# Patient Record
Sex: MALE | Race: WHITE | Employment: OTHER | URBAN - NONMETROPOLITAN AREA
[De-identification: names, ages, dates, MRNs, and addresses within clinical notes are randomized per-mention and may not be internally consistent; named-entity substitution may affect disease eponyms.]

---
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
## 2020-11-25 ENCOUNTER — NURSE TRIAGE (OUTPATIENT)
Dept: OTHER | Facility: CLINIC | Age: 20
End: 2020-11-25

## 2020-11-25 NOTE — TELEPHONE ENCOUNTER
Reason for Disposition   [1] MODERATE pain (e.g., interferes with normal activities, limping) AND [2] present > 3 days    Answer Assessment - Initial Assessment Questions  1. LOCATION: \"Where is the swelling located? \"  (e.g., left, right, both knees)      Left knee    2. SIZE and DESCRIPTION: \"What does the swelling look like? \"  (e.g., entire knee, localized)      Bottom of the knee about size of half of tennis ball     3. ONSET: \"When did the swelling start? \" \"Does it come and go, or is it there all the time? \"      Month and half ago  Flares up then goes back down    4. PAIN: \"Is there any pain? \" If so, ask: \"How bad is it? \" (Scale 1-10; or mild, moderate, severe)      Mild to moderate    5. SETTING: \"Has there been any recent work, exercise or other activity that involved that part of the body? 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 AGGRAVATING FACTORS: \"What makes the knee swelling worse? \" (e.g., walking, climbing stairs, running)      When working down on knees    7. ASSOCIATED SYMPTOMS: \"Is there any pain or redness? \"      Pain no redness    8. OTHER SYMPTOMS: \"Do you have any other symptoms? \" (e.g., chest pain, difficulty breathing, fever, calf pain)      No    9. PREGNANCY: \"Is there any chance you are pregnant? \" \"When was your last menstrual period? \"      n/a    Protocols used: KNEE SWELLING-ADULT-    Caller provided care advice and instructed to call back with worsening symptoms. Attention Provider: Thank you for allowing me to participate in the care of your patient. The patient was connected to triage in response to information provided to the [facility]. Please do not respond through this encounter as the response is not directed to a shared pool.      Warm transfer to [facility]

## 2020-12-15 ENCOUNTER — OFFICE VISIT (OUTPATIENT)
Dept: PRIMARY CARE CLINIC | Age: 20
End: 2020-12-15
Payer: MEDICAID

## 2020-12-15 VITALS
DIASTOLIC BLOOD PRESSURE: 70 MMHG | RESPIRATION RATE: 18 BRPM | HEART RATE: 96 BPM | TEMPERATURE: 99.3 F | HEIGHT: 75 IN | WEIGHT: 200.4 LBS | OXYGEN SATURATION: 98 % | BODY MASS INDEX: 24.92 KG/M2 | SYSTOLIC BLOOD PRESSURE: 130 MMHG

## 2020-12-15 PROCEDURE — 99214 OFFICE O/P EST MOD 30 MIN: CPT | Performed by: FAMILY MEDICINE

## 2020-12-15 ASSESSMENT — PATIENT HEALTH QUESTIONNAIRE - PHQ9
SUM OF ALL RESPONSES TO PHQ QUESTIONS 1-9: 0
SUM OF ALL RESPONSES TO PHQ QUESTIONS 1-9: 0
2. FEELING DOWN, DEPRESSED OR HOPELESS: 0
SUM OF ALL RESPONSES TO PHQ QUESTIONS 1-9: 0
1. LITTLE INTEREST OR PLEASURE IN DOING THINGS: 0
SUM OF ALL RESPONSES TO PHQ9 QUESTIONS 1 & 2: 0

## 2020-12-16 ASSESSMENT — ENCOUNTER SYMPTOMS
RHINORRHEA: 0
COLOR CHANGE: 0
DIARRHEA: 0
COUGH: 0
ABDOMINAL PAIN: 0
NAUSEA: 0
CONSTIPATION: 0
VOMITING: 0

## 2020-12-16 NOTE — PROGRESS NOTES
SUBJECTIVE:    Patient ID: Phyllis Lima is a 21 y.o. male. HPI:   Patient is here today for complaints of left knee pain and swelling. He states that this started about 2 months ago. 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 He states that when he is down on his knee that is what hurts at the most.  He states that if he is up walking it does not typically bother him much. He states that it has been a little red and has been a little warm. He states that he has not had any other joints that have been warm or swollen. He states that he has not had any work-up done of it to this point. He has not tried doing anything for it. He does have a history of ADHD. He has been on Vyvanse as well as Adderall XR in the past.  He states that the Vyvanse worked the best for him. He states the Adderall did not do well for him. He is also taken Ritalin in the past and he states that this made him very irritable. He states that he would like to go back on the Vyvanse if possible. He is having difficulty focusing and getting tasks accomplished. He states that these medications help him stay on task and focus. History reviewed. No pertinent past medical history. Current Outpatient Medications   Medication Sig Dispense Refill    lisdexamfetamine (VYVANSE) 30 MG capsule Take 1 capsule by mouth every morning for 30 days. 30 capsule 0     No current facility-administered medications for this visit. Allergies   Allergen Reactions    Hydromorphone Hcl        Review of Systems   Constitutional: Negative for activity change, appetite change and fatigue. HENT: Negative for congestion and rhinorrhea. Eyes: Negative for visual disturbance. Respiratory: Negative for cough. Cardiovascular: Negative for chest pain and palpitations. Gastrointestinal: Negative for abdominal pain, constipation, diarrhea, nausea and vomiting. Genitourinary: Negative for decreased urine volume and difficulty urinating. Musculoskeletal: Positive for joint swelling (left knee). Negative for arthralgias. Skin: Negative for color change and rash. Allergic/Immunologic: Negative for immunocompromised state. Neurological: Negative for seizures and headaches. Hematological: Does not bruise/bleed easily. Psychiatric/Behavioral: Positive for decreased concentration. Negative for agitation and sleep disturbance. OBJECTIVE:     Physical Exam  Constitutional:       General: He is not in acute distress. Appearance: He is well-developed. He is not diaphoretic. HENT:      Head: Normocephalic and atraumatic. Neck:      Musculoskeletal: Normal range of motion and neck supple. Thyroid: No thyromegaly. Cardiovascular:      Rate and Rhythm: Normal rate and regular rhythm. Heart sounds: Normal heart sounds. Pulmonary:      Effort: Pulmonary effort is normal. No respiratory distress. Breath sounds: Normal breath sounds. No wheezing. Musculoskeletal:      Left knee: He exhibits swelling and effusion. Lymphadenopathy:      Cervical: No cervical adenopathy. Skin:     General: Skin is warm and dry. Findings: No rash. Neurological:      Mental Status: He is alert and oriented to person, place, and time. Psychiatric:         Behavior: Behavior normal.         Thought Content: Thought content normal.         Judgment: Judgment normal.        /70   Pulse 96   Temp 99.3 °F (37.4 °C)   Resp 18   Ht 6' 3\" (1.905 m)   Wt 200 lb 6.4 oz (90.9 kg)   SpO2 98%   BMI 25.05 kg/m²      ASSESSMENT:    Sunday Marc was seen today for knee pain and adhd. Diagnoses and all orders for this visit:    Left knee pain, unspecified chronicity  -     XR KNEE LEFT (3 VIEWS);  Future    Attention deficit hyperactivity disorder (ADHD), combined type

## 2021-03-15 DIAGNOSIS — F90.2 ATTENTION DEFICIT HYPERACTIVITY DISORDER (ADHD), COMBINED TYPE: ICD-10-CM

## 2021-04-20 DIAGNOSIS — F90.2 ATTENTION DEFICIT HYPERACTIVITY DISORDER (ADHD), COMBINED TYPE: ICD-10-CM

## 2021-05-14 ENCOUNTER — OFFICE VISIT (OUTPATIENT)
Dept: PRIMARY CARE CLINIC | Age: 21
End: 2021-05-14
Payer: MEDICAID

## 2021-05-14 VITALS
TEMPERATURE: 98.7 F | RESPIRATION RATE: 16 BRPM | WEIGHT: 184.8 LBS | BODY MASS INDEX: 22.5 KG/M2 | DIASTOLIC BLOOD PRESSURE: 64 MMHG | SYSTOLIC BLOOD PRESSURE: 116 MMHG | HEIGHT: 76 IN | OXYGEN SATURATION: 98 % | HEART RATE: 108 BPM

## 2021-05-14 DIAGNOSIS — M25.562 ACUTE PAIN OF LEFT KNEE: Primary | ICD-10-CM

## 2021-05-14 DIAGNOSIS — M79.604 ACUTE PAIN OF RIGHT LOWER EXTREMITY: ICD-10-CM

## 2021-05-14 DIAGNOSIS — S01.81XA FACIAL LACERATION, INITIAL ENCOUNTER: ICD-10-CM

## 2021-05-14 DIAGNOSIS — Z23 NEED FOR TD VACCINE: ICD-10-CM

## 2021-05-14 DIAGNOSIS — S81.012A KNEE LACERATION, LEFT, INITIAL ENCOUNTER: ICD-10-CM

## 2021-05-14 PROCEDURE — 12001 RPR S/N/AX/GEN/TRNK 2.5CM/<: CPT | Performed by: NURSE PRACTITIONER

## 2021-05-14 PROCEDURE — 90471 IMMUNIZATION ADMIN: CPT | Performed by: NURSE PRACTITIONER

## 2021-05-14 PROCEDURE — 12011 RPR F/E/E/N/L/M 2.5 CM/<: CPT | Performed by: NURSE PRACTITIONER

## 2021-05-14 PROCEDURE — 90714 TD VACC NO PRESV 7 YRS+ IM: CPT | Performed by: NURSE PRACTITIONER

## 2021-05-14 PROCEDURE — 99214 OFFICE O/P EST MOD 30 MIN: CPT | Performed by: NURSE PRACTITIONER

## 2021-05-14 RX ORDER — CEPHALEXIN 500 MG/1
500 CAPSULE ORAL 3 TIMES DAILY
Qty: 21 CAPSULE | Refills: 0 | Status: SHIPPED | OUTPATIENT
Start: 2021-05-14 | End: 2021-05-21

## 2021-05-14 RX ORDER — FLUTICASONE PROPIONATE 50 MCG
2 SPRAY, SUSPENSION (ML) NASAL DAILY
Qty: 1 BOTTLE | Refills: 0 | Status: SHIPPED | OUTPATIENT
Start: 2021-05-14 | End: 2021-12-21

## 2021-05-14 ASSESSMENT — PATIENT HEALTH QUESTIONNAIRE - PHQ9
1. LITTLE INTEREST OR PLEASURE IN DOING THINGS: 0
SUM OF ALL RESPONSES TO PHQ QUESTIONS 1-9: 0
SUM OF ALL RESPONSES TO PHQ QUESTIONS 1-9: 0
SUM OF ALL RESPONSES TO PHQ9 QUESTIONS 1 & 2: 0

## 2021-05-14 NOTE — PROGRESS NOTES
[facility] PHYSICIAN SERVICES  [facility]  [address] [place] [address]  [address] [place] [address]  Dept: [phone]  Dept Fax: [phone]  Loc: [phone]    Deny Granados is a 24 y.o. male who presents today for his medical conditions/complaints as noted below. Deny Granados is c/o of Knee Injury (Pt presents today for knee injury that happened last night while celebrating his 25st birthday. Pt wife states she got it to stop bleeding and glued it about 3 times but everytime he moved, it would opne. back up and bleed. Pt also has some injuries to his face. )        HPI:     HPI     This 41-year-old male presents today for left knee and right forehead injury. He states that he was celebrating his 21st  birthday last night and while inebriated fell on 2 separate occasions 1 landing on his face on concrete. The second 1 he tripped over the tongue of the trailer and landed on his left knee. He reports open wounds above his right eye and on his left kneecap. Chief Complaint   Patient presents with    Knee Injury     Pt presents today for knee injury that happened last night while celebrating his 25st birthday. Pt wife states she got it to stop bleeding and glued it about 3 times but everytime he moved, it would opne. back up and bleed. Pt also has some injuries to his face. History reviewed. No pertinent past medical history.    Past Surgical History:   Procedure Laterality Date    CHOLECYSTECTOMY  March 2014    MOUTH SURGERY      Broke jaw and had to have surrgery        Vitals 5/14/2021 12/15/2020 11/6/2018 10/8/2018 2/27/2018 9/86/5546   SYSTOLIC [id number] [phone] 186 601   DIASTOLIC 64 70 80 64 64 82   Site Left Upper Arm - - - Right Arm -   Position Sitting - - - Sitting -   Cuff Size Large Adult - - - Medium Adult -   Pulse 108 96 89 56 90 78   Temp 98.7 99.3 98.9 98.1 98.7 98.9   Resp 16 18 20 - - 16   SpO2 98 98 99 98 98 -   Weight 184 lb 12.8 oz 200 lb 6.4 oz 183 lb 184 lb 180 lb 171 lb   Height 6' Neurological: Positive for headaches. Negative for dizziness, light-headedness and numbness. Hematological: Negative. Psychiatric/Behavioral: Negative. Objective:     Physical Exam  Vitals and nursing note reviewed. Constitutional:       General: He is not in acute distress. Appearance: Normal appearance. He is not ill-appearing or toxic-appearing. HENT:      Head: Normocephalic and atraumatic. Nose: Nose normal.      Mouth/Throat:      Mouth: Mucous membranes are moist.      Pharynx: Oropharynx is clear. Eyes:      General: Vision grossly intact. Right eye: Discharge present. Left eye: Discharge present. Extraocular Movements: Extraocular movements intact. Conjunctiva/sclera:      Right eye: Right conjunctiva is injected. No hemorrhage. Left eye: Left conjunctiva is injected. No hemorrhage. Pupils: Pupils are equal, round, and reactive to light. Funduscopic exam:     Right eye: No hemorrhage. Red reflex present. Left eye: No hemorrhage. Red reflex present. Comments: Thick stringy allergic discharge from both eyes. Cardiovascular:      Rate and Rhythm: Normal rate and regular rhythm. Pulses: Normal pulses. Heart sounds: Normal heart sounds. Pulmonary:      Effort: Pulmonary effort is normal. No respiratory distress. Breath sounds: Normal breath sounds. No wheezing, rhonchi or rales. Abdominal:      Palpations: Abdomen is soft. Musculoskeletal:         General: Swelling, tenderness, deformity and signs of injury present. Normal range of motion. Cervical back: Normal range of motion and neck supple. No rigidity or tenderness. Right knee: Normal.      Left knee: Swelling, deformity, erythema, laceration and bony tenderness present. Tenderness present over the patellar tendon. Instability Tests: Anterior drawer test negative. Anterior Lachman test negative.  Medial Yunior test negative and lateral Yunior test negative. Legs:    Lymphadenopathy:      Cervical: No cervical adenopathy. Skin:     General: Skin is warm and dry. Capillary Refill: Capillary refill takes less than 2 seconds. Findings: Bruising, lesion and rash present. Neurological:      Mental Status: He is alert and oriented to person, place, and time. Psychiatric:         Mood and Affect: Mood normal.         Behavior: Behavior normal.         Thought Content: Thought content normal.         Judgment: Judgment normal.       /64 (Site: Left Upper Arm, Position: Sitting, Cuff Size: Large Adult)   Pulse 108   Temp 98.7 °F (37.1 °C) (Temporal)   Resp 16   Ht 6' 4\" (1.93 m)   Wt 184 lb 12.8 oz (83.8 kg)   SpO2 98%   BMI 22.49 kg/m²     Assessment:       Diagnosis Orders   1. Acute pain of left knee  XR KNEE LEFT (3 VIEWS)    XR FACIAL BONES (MIN 3 VIEWS )   2. Acute pain of right lower extremity  XR TIBIA FIBULA RIGHT (2 VIEWS)   3. Facial laceration, initial encounter           Plan:     1 and 2. X-ray of left knee and right tib-fib today in the office. Results reviewed no evidence of fracture, dislocation or foreign body. Procedure note. Left knee draped and prepped in a sterile fashion. Site was cleaned with Hibiclens and Betadine. Wound was injected with 10 mils of lidocaine with epi and allowed to sit for 5 minutes to take effect. The wound was then irrigated with normal saline using a 20 mils syringe with 18-gauge needle. The wound was then closed with six 3-0 nylon sutures. Bacitracin was applied and covered with a large Band-Aid. 3.  X-ray of the facial bones was obtained in the office today. Results were reviewed no evidence of fracture or foreign body was noted. Site was cleaned with chlorhexidine and Betadine. Topical lidocaine was applied to wound and allowed to sit. Then the wound was irrigated with normal saline.   Further anesthetic was utilized with 4 mils of lidocaine with epi was injected into the wound and allowed to sit. The wound was then cleaned again due to copious debris. The wound was well approximated and closed utilizing skinaffix adhesive glue and Steri-Strips. Small amount of bacitracin was applied to the cover the wound. He was instructed to keep both of these areas clean. May use small amounts of Dial soap to clean the areas. Apply bacitracin twice a day. Patient given educational materials -see patient instructions. Discussed use, benefit, and side effects of prescribed medications. All patient questions answered. Pt voiced understanding. Reviewed health maintenance. Instructed to continue currentmedications, diet and exercise. Patient agreed with treatment plan. Follow up as directed. MEDICATIONS:  Orders Placed This Encounter   Medications    fluticasone (FLONASE) 50 MCG/ACT nasal spray     Si sprays by Each Nostril route daily     Dispense:  1 Bottle     Refill:  0    cephALEXin (KEFLEX) 500 MG capsule     Sig: Take 1 capsule by mouth 3 times daily for 7 days     Dispense:  21 capsule     Refill:  0         ORDERS:  Orders Placed This Encounter   Procedures    XR KNEE LEFT (3 VIEWS)    XR TIBIA FIBULA RIGHT (2 VIEWS)    XR FACIAL BONES (MIN 3 VIEWS )       Follow-up:  No follow-ups on file. PATIENT INSTRUCTIONS:    Hydrate with gatorade or pedilyte    Use saline eye wash as needed. Pataday allergy eye drops as needed. returnin 10 days for suture removal .    Allow steri-strips to come off on their own. There are no Patient Instructions on file for this visit. Electronically signed by SHAMIR Lawrence NP on 2021 at 8:59 AM    EMR Dragon/transcription disclaimer:  Much of thisencounter note is electronic transcription/translation of spoken language to printed texts. The electronic translation of spoken language may be erroneous, or at times, nonsensical words or phrases may be inadvertentlytranscribed.   Although I have reviewed the note for such errors, some may still exist.

## 2021-05-17 RX ORDER — TETANUS AND DIPHTHERIA TOXOIDS ADSORBED 2; 2 [LF]/.5ML; [LF]/.5ML
0.5 INJECTION INTRAMUSCULAR ONCE
Qty: 0.5 ML | Refills: 0 | Status: SHIPPED | OUTPATIENT
Start: 2021-05-17 | End: 2021-05-17 | Stop reason: CLARIF

## 2021-05-17 ASSESSMENT — ENCOUNTER SYMPTOMS
EYE REDNESS: 1
RESPIRATORY NEGATIVE: 1
PHOTOPHOBIA: 0
EYE PAIN: 0
EYE DISCHARGE: 1
ALLERGIC/IMMUNOLOGIC NEGATIVE: 1
GASTROINTESTINAL NEGATIVE: 1

## 2021-05-17 ASSESSMENT — VISUAL ACUITY: OU: 1

## 2021-05-26 ENCOUNTER — OFFICE VISIT (OUTPATIENT)
Dept: PRIMARY CARE CLINIC | Age: 21
End: 2021-05-26
Payer: MEDICAID

## 2021-05-26 VITALS
HEART RATE: 104 BPM | HEIGHT: 75 IN | OXYGEN SATURATION: 100 % | BODY MASS INDEX: 24.2 KG/M2 | RESPIRATION RATE: 16 BRPM | SYSTOLIC BLOOD PRESSURE: 122 MMHG | DIASTOLIC BLOOD PRESSURE: 76 MMHG | TEMPERATURE: 98.2 F | WEIGHT: 194.6 LBS

## 2021-05-26 DIAGNOSIS — S01.81XA FACIAL LACERATION, INITIAL ENCOUNTER: ICD-10-CM

## 2021-05-26 DIAGNOSIS — M70.42 PREPATELLAR BURSITIS OF LEFT KNEE: Primary | ICD-10-CM

## 2021-05-26 DIAGNOSIS — F90.2 ATTENTION DEFICIT HYPERACTIVITY DISORDER (ADHD), COMBINED TYPE: ICD-10-CM

## 2021-05-26 DIAGNOSIS — S81.012A KNEE LACERATION, LEFT, INITIAL ENCOUNTER: ICD-10-CM

## 2021-05-26 PROCEDURE — 99213 OFFICE O/P EST LOW 20 MIN: CPT | Performed by: FAMILY MEDICINE

## 2021-05-26 ASSESSMENT — ENCOUNTER SYMPTOMS
COUGH: 0
VOMITING: 0
CONSTIPATION: 0
COLOR CHANGE: 0
ABDOMINAL PAIN: 0
RHINORRHEA: 0
DIARRHEA: 0
NAUSEA: 0

## 2021-05-26 NOTE — PROGRESS NOTES
SUBJECTIVE:    Patient ID: Talon Hoover is a 24 y.o. male. HPI:   Patient is seen today for 2-week follow-up after he recently had stitches put in his knee left knee after a fall in his right eyebrow. He states that the areas have healed well. The swelling is gone down in his knees tremendously. He did get a TD vaccine when he was here in the office. He states that he is on Vyvanse and is tolerating this well. He states that the dose is working well for him and he does need a refill on this today. He denies any drainage from the wound area. He states that he has not had any fevers or chills. He is able to move his knee normally. History reviewed. No pertinent past medical history. Current Outpatient Medications on File Prior to Visit   Medication Sig Dispense Refill    fluticasone (FLONASE) 50 MCG/ACT nasal spray 2 sprays by Each Nostril route daily 1 Bottle 0     No current facility-administered medications on file prior to visit. Allergies   Allergen Reactions    Hydromorphone Hcl        Review of Systems   Constitutional: Negative for activity change, appetite change and fatigue. HENT: Negative for congestion and rhinorrhea. Eyes: Negative for visual disturbance. Respiratory: Negative for cough. Cardiovascular: Negative for chest pain and palpitations. Gastrointestinal: Negative for abdominal pain, constipation, diarrhea, nausea and vomiting. Genitourinary: Negative for decreased urine volume and difficulty urinating. Musculoskeletal: Negative for arthralgias. Knee laceration healing well    Skin: Negative for color change and rash. Allergic/Immunologic: Negative for immunocompromised state. Neurological: Negative for seizures and headaches. Hematological: Does not bruise/bleed easily. Psychiatric/Behavioral: Negative for agitation and sleep disturbance. OBJECTIVE:    Physical Exam  Constitutional:       General: He is not in acute distress. Appearance: He is well-developed. He is not diaphoretic. HENT:      Head: Normocephalic and atraumatic. Neck:      Thyroid: No thyromegaly. Cardiovascular:      Rate and Rhythm: Normal rate and regular rhythm. Heart sounds: Normal heart sounds. Pulmonary:      Effort: Pulmonary effort is normal. No respiratory distress. Breath sounds: Normal breath sounds. No wheezing. Abdominal:      General: Bowel sounds are normal.      Palpations: Abdomen is soft. Tenderness: There is no abdominal tenderness. Musculoskeletal:      Cervical back: Normal range of motion and neck supple. Lymphadenopathy:      Cervical: No cervical adenopathy. Skin:     General: Skin is warm and dry. Findings: No rash. Comments: Sutures were removed from the knee laceration. Of the area is clean dry and intact and shows no purulent discharge. There is no streaking erythema. The laceration over his right eyebrow has also healed well and is intact. Neurological:      Mental Status: He is alert and oriented to person, place, and time. Psychiatric:         Behavior: Behavior normal.         Thought Content: Thought content normal.         Judgment: Judgment normal.        /76 (Site: Left Upper Arm, Position: Sitting, Cuff Size: Large Adult)   Pulse 104   Temp 98.2 °F (36.8 °C) (Temporal)   Resp 16   Ht 6' 3\" (1.905 m)   Wt 194 lb 9.6 oz (88.3 kg)   SpO2 100%   BMI 24.32 kg/m²      ASSESSMENT:    Bernadine Lee was seen today for 2 week follow-up. Diagnoses and all orders for this visit:    Prepatellar bursitis of left knee    Attention deficit hyperactivity disorder (ADHD), combined type  -     lisdexamfetamine (VYVANSE) 30 MG capsule; Take 1 capsule by mouth every morning for 30 days. Knee laceration, left, initial encounter    Facial laceration, initial encounter        PLAN:    Vyvanse was refilled today. Continue current medications. Sutures were removed without complication.   Follow-up with us in 6 months for checkup unless needed sooner. EMR Dragon/transcription disclaimer:  Much of this encounter note is electronic transcription/translation of spoken language toprinted texts. The electronic translation of spoken language may be erroneous, or at times, nonsensical words or phrases may be inadvertently transcribed.   Although I have reviewed the note for such errors, some may stillexist.

## 2021-06-01 DIAGNOSIS — F90.2 ATTENTION DEFICIT HYPERACTIVITY DISORDER (ADHD), COMBINED TYPE: ICD-10-CM

## 2021-06-01 RX ORDER — LISDEXAMFETAMINE DIMESYLATE 30 MG/1
CAPSULE ORAL
Qty: 30 CAPSULE | Refills: 0 | Status: SHIPPED | OUTPATIENT
Start: 2021-06-01 | End: 2021-08-05

## 2021-08-05 DIAGNOSIS — F90.2 ATTENTION DEFICIT HYPERACTIVITY DISORDER (ADHD), COMBINED TYPE: ICD-10-CM

## 2021-08-05 RX ORDER — LISDEXAMFETAMINE DIMESYLATE 30 MG/1
CAPSULE ORAL
Qty: 30 CAPSULE | Refills: 0 | Status: SHIPPED | OUTPATIENT
Start: 2021-08-05 | End: 2021-09-15

## 2021-09-15 DIAGNOSIS — F90.2 ATTENTION DEFICIT HYPERACTIVITY DISORDER (ADHD), COMBINED TYPE: ICD-10-CM

## 2021-09-15 RX ORDER — LISDEXAMFETAMINE DIMESYLATE 30 MG/1
CAPSULE ORAL
Qty: 30 CAPSULE | Refills: 0 | Status: SHIPPED | OUTPATIENT
Start: 2021-09-15 | End: 2021-10-19 | Stop reason: SDUPTHER

## 2021-10-19 ENCOUNTER — TELEPHONE (OUTPATIENT)
Dept: PRIMARY CARE CLINIC | Age: 21
End: 2021-10-19

## 2021-10-19 DIAGNOSIS — F90.2 ATTENTION DEFICIT HYPERACTIVITY DISORDER (ADHD), COMBINED TYPE: ICD-10-CM

## 2021-11-18 DIAGNOSIS — F90.2 ATTENTION DEFICIT HYPERACTIVITY DISORDER (ADHD), COMBINED TYPE: ICD-10-CM

## 2021-11-18 RX ORDER — LISDEXAMFETAMINE DIMESYLATE 30 MG/1
CAPSULE ORAL
Qty: 30 CAPSULE | Refills: 0 | Status: SHIPPED | OUTPATIENT
Start: 2021-11-18 | End: 2021-12-21 | Stop reason: SDUPTHER

## 2021-12-21 ENCOUNTER — OFFICE VISIT (OUTPATIENT)
Dept: PRIMARY CARE CLINIC | Age: 21
End: 2021-12-21
Payer: MEDICAID

## 2021-12-21 VITALS
HEART RATE: 88 BPM | DIASTOLIC BLOOD PRESSURE: 72 MMHG | TEMPERATURE: 97.6 F | WEIGHT: 184.6 LBS | HEIGHT: 75 IN | OXYGEN SATURATION: 98 % | SYSTOLIC BLOOD PRESSURE: 122 MMHG | RESPIRATION RATE: 18 BRPM | BODY MASS INDEX: 22.95 KG/M2

## 2021-12-21 DIAGNOSIS — Z79.899 MEDICATION MANAGEMENT: Primary | ICD-10-CM

## 2021-12-21 DIAGNOSIS — F90.2 ATTENTION DEFICIT HYPERACTIVITY DISORDER (ADHD), COMBINED TYPE: ICD-10-CM

## 2021-12-21 PROCEDURE — 80305 DRUG TEST PRSMV DIR OPT OBS: CPT | Performed by: NURSE PRACTITIONER

## 2021-12-21 PROCEDURE — 99213 OFFICE O/P EST LOW 20 MIN: CPT | Performed by: NURSE PRACTITIONER

## 2021-12-21 ASSESSMENT — ENCOUNTER SYMPTOMS
ALLERGIC/IMMUNOLOGIC NEGATIVE: 1
GASTROINTESTINAL NEGATIVE: 1
EYES NEGATIVE: 1
RESPIRATORY NEGATIVE: 1

## 2021-12-21 NOTE — PROGRESS NOTES
[facility] PHYSICIAN SERVICES  [facility]  [address] [place] [address]  [address] [place] [address]  Dept: [phone]  Dept Fax: [phone]  Loc: [phone]    Angel Ba is a 24 y.o. male who presents today for his medical conditions/complaints as noted below. Angel Ba is c/o of Medication Management        HPI:     HPI     This 24-year-old male presents today for medication management for refills on his Vyvanse. He states that he has been maintaining his weight now fairly well. He does increase a bit and increased appetite. He also states that he is sleeping well. He does state on occasion he does take a 1 pull from a THC vape for sleep. Working for [employer] and [employer]. Chief Complaint   Patient presents with    Medication Management     No past medical history on file. Past Surgical History:   Procedure Laterality Date    CHOLECYSTECTOMY  March 2014    MOUTH SURGERY      Broke jaw and had to have surrgery        Vitals 12/21/2021 5/26/2021 5/14/2021 12/15/2020 11/6/2018 38/7/5401   SYSTOLIC 364 100 087 [phone]   DIASTOLIC 72 76 64 70 80 64   Site - Left Upper Arm Left Upper Arm - - -   Position - Sitting Sitting - - -   Cuff Size - Large Adult Large Adult - - -   Pulse 88 104 108 96 89 56   Temp 97.6 98.2 98.7 99.3 98.9 98.1   Resp 18 16 16 18 20 -   SpO2 98 100 98 98 99 98   Weight 184 lb 9.6 oz 194 lb 9.6 oz 184 lb 12.8 oz 200 lb 6.4 oz 183 lb 184 lb   Height 6' 3\" 6' 3\" 6' 4\" 6' 3\" 6' 3\" 6' 3\"   Body mass index 23.07 kg/m2 24.32 kg/m2 22.49 kg/m2 25.05 kg/m2 22.87 kg/m2 23 kg/m2   Some recent data might be hidden       Family History   Problem Relation Age of Onset    Other Mother         MS    Hypertension Father     Cancer Paternal Grandmother         Colon        Social History     Tobacco Use    Smoking status: Never Smoker    Smokeless tobacco: Never Used   Substance Use Topics    Alcohol use: Yes      No current outpatient medications on file prior to visit.      No current facility-administered medications on file prior to visit. Allergies   Allergen Reactions    Hydromorphone Hcl        Health Maintenance   Topic Date Due    Hepatitis C screen  Never done    COVID-19 Vaccine (1) Never done    HPV vaccine (1 - Male 2-dose series) Never done    Varicella vaccine (2 of 2 - 13+ 2-dose series) 08/30/2013    HIV screen  Never done    Flu vaccine (1) Never done    DTaP/Tdap/Td vaccine (3 - Td or Tdap) 05/14/2031    Hepatitis A vaccine  Aged Out    Hepatitis B vaccine  Aged Out    Hib vaccine  Aged Out    Meningococcal (ACWY) vaccine  Aged Out    Pneumococcal 0-64 years Vaccine  Aged Out       Subjective:      Review of Systems   Constitutional: Negative. Negative for fever and unexpected weight change. Eyes: Negative. Respiratory: Negative. Cardiovascular: Negative. Gastrointestinal: Negative. Endocrine: Negative. Genitourinary: Negative. Musculoskeletal: Negative. Skin: Negative. Allergic/Immunologic: Negative. Neurological: Negative. Hematological: Negative. Psychiatric/Behavioral: Positive for decreased concentration and sleep disturbance. Objective:     Physical Exam  Vitals and nursing note reviewed. Constitutional:       General: He is not in acute distress. Appearance: Normal appearance. He is not ill-appearing or toxic-appearing. HENT:      Head: Normocephalic and atraumatic. Nose: Nose normal.      Mouth/Throat:      Mouth: Mucous membranes are moist.   Eyes:      Pupils: Pupils are equal, round, and reactive to light. Cardiovascular:      Rate and Rhythm: Normal rate and regular rhythm. Pulses: Normal pulses. Heart sounds: Normal heart sounds. Pulmonary:      Effort: Pulmonary effort is normal. No respiratory distress. Breath sounds: Normal breath sounds. No wheezing or rhonchi. Abdominal:      General: Bowel sounds are normal.      Palpations: Abdomen is soft.    Musculoskeletal: General: Normal range of motion. Cervical back: Normal range of motion. Skin:     General: Skin is warm and dry. Coloration: Skin is not jaundiced or pale. Findings: No erythema or rash. Neurological:      Mental Status: He is alert and oriented to person, place, and time. Mental status is at baseline. Psychiatric:         Attention and Perception: Attention normal.         Mood and Affect: Mood and affect normal.         Speech: Speech normal.         Behavior: Behavior normal. Behavior is cooperative. Thought Content: Thought content normal. Thought content does not include homicidal or suicidal ideation. Cognition and Memory: Cognition normal.         Judgment: Judgment normal.       /72   Pulse 88   Temp 97.6 °F (36.4 °C)   Resp 18   Ht 6' 3\" (1.905 m)   Wt 184 lb 9.6 oz (83.7 kg)   SpO2 98%   BMI 23.07 kg/m²     Assessment:       Diagnosis Orders   1. Medication management  POCT Rapid Drug Screen   2. Attention deficit hyperactivity disorder (ADHD), combined type           Plan:   POCT rapid drug screen in office today positive for amphetamines. Pansy Ana Cristina reviewed is clean. Contract updated today. PDMP Monitoring:    Last PDMP Junior Crews as Reviewed [facility]):  Review User Review Instant Review Result   Bryce Rico 12/21/2021  4:37 PM Reviewed PDMP [1]     Last Controlled Substance Monitoring Documentation      Office Visit from 10/8/2018 in [facility] The Prescription Monitoring Report for this patient was reviewed today. filed at 10/08/2018 1521   Periodic Controlled Substance Monitoring Possible medication side effects, risk of tolerance/dependence & alternative treatments discussed., No signs of potential drug abuse or diversion identified.  filed at 10/08/2018 1521        Urine Drug Screenings (1 yr)     POCT Rapid Drug Screen  Collected: 12/21/2021 (Final result)    Complete Results              Medication Contract and Consent for Opioid Use Documents Filed     Patient Documents     Type of Document Status Date Received Received By Description    Medication Contract Signed 10/8/2018  3:27 PM Mal Gonzalez request sent to Dr. Henry Mckeon for completion. Patient given educational materials -see patient instructions. Discussed use, benefit, and side effects of prescribed medications. All patient questions answered. Pt voiced understanding. Reviewed health maintenance. Instructed to continue currentmedications, diet and exercise. Patient agreed with treatment plan. Follow up as directed. MEDICATIONS:  No orders of the defined types were placed in this encounter. ORDERS:  Orders Placed This Encounter   Procedures    POCT Rapid Drug Screen       Follow-up:  Return in about 6 months (around 6/21/2022). PATIENT INSTRUCTIONS:  There are no Patient Instructions on file for this visit. Electronically signed by SHAMIR Hunter NP on 12/21/2021 at 5:25 PM    EMR Dragon/transcription disclaimer:  Much of thisencounter note is electronic transcription/translation of spoken language to printed texts. The electronic translation of spoken language may be erroneous, or at times, nonsensical words or phrases may be inadvertentlytranscribed.   Although I have reviewed the note for such errors, some may still exist.

## 2022-01-21 DIAGNOSIS — F90.2 ATTENTION DEFICIT HYPERACTIVITY DISORDER (ADHD), COMBINED TYPE: ICD-10-CM

## 2022-01-21 NOTE — TELEPHONE ENCOUNTER
PDMP Monitoring:    Last PDMP Valentin Randall as Reviewed [facility]):  Review User Review Instant Review Result   Paolo Perez 12/21/2021  4:37 PM Reviewed PDMP [1]     Urine Drug Screenings (1 yr)     POCT Rapid Drug Screen  Collected: 12/21/2021 (Final result)    Complete Results              Medication Contract and Consent for Opioid Use Documents Filed     Patient Documents     Type of Document Status Date Received Received By Description    Medication Contract Signed 10/8/2018  3:27 PM Joann Davenport

## 2022-02-21 DIAGNOSIS — F90.2 ATTENTION DEFICIT HYPERACTIVITY DISORDER (ADHD), COMBINED TYPE: ICD-10-CM

## 2022-02-21 NOTE — TELEPHONE ENCOUNTER
PDMP Monitoring:    Last PDMP Media Lalit as Reviewed [facility]):  Review User Review Instant Review Result   Ángel Marker 12/21/2021  4:37 PM Reviewed PDMP [1]     Urine Drug Screenings (1 yr)     POCT Rapid Drug Screen  Collected: 12/21/2021 (Final result)    Complete Results              Medication Contract and Consent for Opioid Use Documents Filed     Patient Documents     Type of Document Status Date Received Received By Description    Medication Contract Signed 10/8/2018  3:27 PM Yoli Goff

## 2022-03-28 DIAGNOSIS — F90.2 ATTENTION DEFICIT HYPERACTIVITY DISORDER (ADHD), COMBINED TYPE: ICD-10-CM

## 2022-03-28 NOTE — TELEPHONE ENCOUNTER
Provider needs to review PDMP    PDMP Monitoring:    Last PDMP Tom Rooney as Reviewed [facility]):  Review User Review Instant Review Result   Joshua Curiel 12/21/2021  4:37 PM Reviewed PDMP [1]     Urine Drug Screenings (1 yr)     POCT Rapid Drug Screen  Collected: 12/21/2021 (Final result)    Complete Results              Medication Contract and Consent for Opioid Use Documents Filed     Patient Documents     Type of Document Status Date Received Received By Description    Medication Contract Signed 10/8/2018  3:27 PM Henny Martinez

## 2022-04-26 DIAGNOSIS — F90.2 ATTENTION DEFICIT HYPERACTIVITY DISORDER (ADHD), COMBINED TYPE: ICD-10-CM

## 2022-04-26 RX ORDER — LISDEXAMFETAMINE DIMESYLATE 30 MG/1
CAPSULE ORAL
Qty: 30 CAPSULE | Refills: 0 | Status: SHIPPED | OUTPATIENT
Start: 2022-04-26 | End: 2022-05-26

## 2022-04-26 NOTE — TELEPHONE ENCOUNTER
PDMP Monitoring:    Last PDMP Paulo Ear as Reviewed [facility]):  Review User Review Instant Review Result   CONRADO Nick 3/28/2022  1:23 PM Reviewed PDMP [1]     Urine Drug Screenings (1 yr)     POCT Rapid Drug Screen  Collected: 12/21/2021 (Final result)    Complete Results              Medication Contract and Consent for Opioid Use Documents Filed     Patient Documents     Type of Document Status Date Received Received By Description    Medication Contract Signed 10/8/2018  3:27 PM Isidra Hanson

## 2022-05-26 DIAGNOSIS — F90.2 ATTENTION DEFICIT HYPERACTIVITY DISORDER (ADHD), COMBINED TYPE: ICD-10-CM

## 2022-05-26 RX ORDER — LISDEXAMFETAMINE DIMESYLATE 30 MG/1
CAPSULE ORAL
Qty: 30 CAPSULE | Refills: 0 | Status: SHIPPED | OUTPATIENT
Start: 2022-05-26 | End: 2022-06-22

## 2022-05-26 NOTE — TELEPHONE ENCOUNTER
PDMP Monitoring:    Last PDMP Tory Morocho as Reviewed [facility]):  Review User Review Instant Review Result   CONRADO Del Angel Si 3/28/2022  1:23 PM Reviewed PDMP [1]     Urine Drug Screenings (1 yr)     POCT Rapid Drug Screen  Collected: 12/21/2021 (Final result)    Complete Results              Medication Contract and Consent for Opioid Use Documents Filed     Patient Documents     Type of Document Status Date Received Received By Description    Medication Contract Signed 10/8/2018  3:27 PM Caitlyn Nevarez

## 2022-05-26 NOTE — TELEPHONE ENCOUNTER
PDMP Monitoring:    Last PDMP Farzana So as Reviewed [facility]):  Review User Review Instant Review Result   CONRADO Tesfaye 3/28/2022  1:23 PM Reviewed PDMP [1]     Urine Drug Screenings (1 yr)     POCT Rapid Drug Screen  Collected: 12/21/2021 (Final result)    Complete Results              Medication Contract and Consent for Opioid Use Documents Filed     Patient Documents     Type of Document Status Date Received Received By Description    Medication Contract Signed 10/8/2018  3:27 PM Joon Manriquez

## 2022-06-22 DIAGNOSIS — F90.2 ATTENTION DEFICIT HYPERACTIVITY DISORDER (ADHD), COMBINED TYPE: ICD-10-CM

## 2022-06-22 NOTE — TELEPHONE ENCOUNTER
PDMP Monitoring:    Last PDMP [facility] as Reviewed [facility]):  Review User Review Instant Review Result   CONRADO Peterson 3/28/2022  1:23 PM Reviewed PDMP [1]     Urine Drug Screenings (1 yr)     POCT Rapid Drug Screen  Collected: 12/21/2021 (Final result)    Complete Results              Medication Contract and Consent for Opioid Use Documents Filed     Patient Documents     Type of Document Status Date Received Received By Description    Medication Contract Signed 10/8/2018  3:27 PM Eugene Sharif

## 2022-06-28 ENCOUNTER — OFFICE VISIT (OUTPATIENT)
Dept: PRIMARY CARE CLINIC | Age: 22
End: 2022-06-28
Payer: MEDICAID

## 2022-06-28 VITALS
BODY MASS INDEX: 23.5 KG/M2 | OXYGEN SATURATION: 98 % | DIASTOLIC BLOOD PRESSURE: 80 MMHG | SYSTOLIC BLOOD PRESSURE: 130 MMHG | TEMPERATURE: 97.7 F | HEART RATE: 88 BPM | WEIGHT: 189 LBS | HEIGHT: 75 IN

## 2022-06-28 DIAGNOSIS — F90.2 ATTENTION DEFICIT HYPERACTIVITY DISORDER (ADHD), COMBINED TYPE: ICD-10-CM

## 2022-06-28 DIAGNOSIS — F41.9 ANXIETY: Primary | ICD-10-CM

## 2022-06-28 PROCEDURE — 99214 OFFICE O/P EST MOD 30 MIN: CPT | Performed by: FAMILY MEDICINE

## 2022-06-28 RX ORDER — CITALOPRAM 10 MG/1
10 TABLET ORAL DAILY
Qty: 30 TABLET | Refills: 5 | Status: SHIPPED | OUTPATIENT
Start: 2022-06-28

## 2022-06-28 ASSESSMENT — ENCOUNTER SYMPTOMS
RHINORRHEA: 0
VOMITING: 0
CONSTIPATION: 0
NAUSEA: 0
COUGH: 0
DIARRHEA: 0
COLOR CHANGE: 0
ABDOMINAL PAIN: 0

## 2022-06-28 ASSESSMENT — PATIENT HEALTH QUESTIONNAIRE - PHQ9
SUM OF ALL RESPONSES TO PHQ9 QUESTIONS 1 & 2: 0
SUM OF ALL RESPONSES TO PHQ QUESTIONS 1-9: 0
2. FEELING DOWN, DEPRESSED OR HOPELESS: 0
SUM OF ALL RESPONSES TO PHQ QUESTIONS 1-9: 0
1. LITTLE INTEREST OR PLEASURE IN DOING THINGS: 0
SUM OF ALL RESPONSES TO PHQ QUESTIONS 1-9: 0
SUM OF ALL RESPONSES TO PHQ QUESTIONS 1-9: 0

## 2022-06-28 NOTE — PROGRESS NOTES
SUBJECTIVE:    Patient ID: Evangelist Garcia is a 25 y.o. male. HPI:   Patient is seen today for 6-month follow-up on his ADHD. He feels like the dose could go up some. He is tolerating it well. He denies any side effects to the medication. He states that he is sleeping well he states that it is not affected his appetite. He states that he does still struggle some with concentration and feels like going up on the dose may help. He states he is also struggling with anxiety. He states that he does have trouble letting things go and gets worked up over things that may be should be easy to let go. 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 He states that it does not typically affect his sleep. He states that his stomach to stays in knots and he worries a lot. He states that he has never been on anything for anxiety in the past.    Past Medical History:   Diagnosis Date    ADHD (attention deficit hyperactivity disorder)       No current outpatient medications on file prior to visit. No current facility-administered medications on file prior to visit. Allergies   Allergen Reactions    Hydromorphone Hcl        Review of Systems   Constitutional: Negative for activity change, appetite change and fatigue. HENT: Negative for congestion and rhinorrhea. Eyes: Negative for visual disturbance. Respiratory: Negative for cough. Cardiovascular: Negative for chest pain and palpitations. Gastrointestinal: Negative for abdominal pain, constipation, diarrhea, nausea and vomiting. Genitourinary: Negative for decreased urine volume and difficulty urinating. Musculoskeletal: Negative for arthralgias. Skin: Negative for color change and rash. Allergic/Immunologic: Negative for immunocompromised state. Neurological: Negative for seizures and headaches. Hematological: Does not bruise/bleed easily.    Psychiatric/Behavioral: Negative for agitation and sleep disturbance. The patient is nervous/anxious. OBJECTIVE:    Physical Exam  Constitutional:       General: He is not in acute distress. Appearance: Normal appearance. He is well-developed. He is not diaphoretic. HENT:      Head: Normocephalic and atraumatic. Neck:      Thyroid: No thyromegaly. Cardiovascular:      Rate and Rhythm: Normal rate and regular rhythm. Pulses: Normal pulses. Heart sounds: Normal heart sounds. Pulmonary:      Effort: Pulmonary effort is normal. No respiratory distress. Breath sounds: Normal breath sounds. No wheezing. Musculoskeletal:      Cervical back: Normal range of motion and neck supple. Lymphadenopathy:      Cervical: No cervical adenopathy. Skin:     General: Skin is warm and dry. Findings: No rash. Neurological:      General: No focal deficit present. Mental Status: He is alert and oriented to person, place, and time. Mental status is at baseline. Psychiatric:         Mood and Affect: Mood normal.         Behavior: Behavior normal.         Thought Content: Thought content normal.         Judgment: Judgment normal.        /80   Pulse 88   Temp 97.7 °F (36.5 °C)   Ht 6' 3\" (1.905 m)   Wt 189 lb (85.7 kg)   SpO2 98%   BMI 23.62 kg/m²      ASSESSMENT/PLAN:    1. Anxiety  2. Attention deficit hyperactivity disorder (ADHD), combined type  -     lisdexamfetamine 40 MG CAPS; Take 40 mg by mouth every morning for 30 days. , Disp-30 capsule, R-0Normal       Increase Vyvanse to 40 mg daily. Follow-up with us in 6 months for checkup unless needed sooner. I am going to go ahead and start him on Celexa for his anxiety. Discussed that I feel he would benefit from something to take daily. If his symptoms or not getting better he is to let us know.     PDMP Monitoring:    Last PDMP [employer]s as Reviewed [facility]):  Review User Review Instant Review Result   CONRADO VILLALPANDO 3/28/2022  1:23 PM Reviewed PDMP [1]     Last Controlled Substance Monitoring Documentation      [facility] Visit from 10/8/2018 [facility] The Prescription Monitoring Report for this patient was reviewed today. filed at 10/08/2018 1521   Periodic Controlled Substance Monitoring Possible medication side effects, risk of tolerance/dependence & alternative treatments discussed., No signs of potential drug abuse or diversion identified. filed at 10/08/2018 1521        Urine Drug Screenings (1 yr)     POCT Rapid Drug Screen  Collected: 12/21/2021 (Final result)    Complete Results              Medication Contract and Consent for Opioid Use Documents Filed     Patient Documents     Type of Document Status Date Received Received By Description    Medication Contract Signed 10/8/2018  3:27 PM Nato Solares                  EMR Dragon/transcription disclaimer:  Much of this encounter note is electronic transcription/translation of spoken language toprinted texts. The electronic translation of spoken language may be erroneous, or at times, nonsensical words or phrases may be inadvertently transcribed.   Although I have reviewed the note for such errors, some may stillexist.

## 2022-07-25 DIAGNOSIS — F90.2 ATTENTION DEFICIT HYPERACTIVITY DISORDER (ADHD), COMBINED TYPE: ICD-10-CM

## 2022-07-25 RX ORDER — LISDEXAMFETAMINE DIMESYLATE 40 MG/1
CAPSULE ORAL
Qty: 30 CAPSULE | Refills: 0 | Status: SHIPPED | OUTPATIENT
Start: 2022-07-25 | End: 2022-08-26

## 2022-07-25 NOTE — TELEPHONE ENCOUNTER
Provider needs to review PDMP    PDMP Monitoring:    Last PDMP Samy Currie as Reviewed [facility]):  Review User Review Instant Review Result   CONRADO Joseph 3/28/2022  1:23 PM Reviewed PDMP [1]     Urine Drug Screenings (1 yr)       POCT Rapid Drug Screen  Collected: 12/21/2021 (Final result)                  Medication Contract and Consent for Opioid Use Documents Filed       Patient Documents       Type of Document Status Date Received Received By Description    Medication Contract Signed 10/8/2018  3:27 PM Adam Mason

## 2022-08-25 DIAGNOSIS — F90.2 ATTENTION DEFICIT HYPERACTIVITY DISORDER (ADHD), COMBINED TYPE: ICD-10-CM

## 2022-08-26 RX ORDER — LISDEXAMFETAMINE DIMESYLATE 40 MG/1
CAPSULE ORAL
Qty: 30 CAPSULE | Refills: 0 | Status: SHIPPED | OUTPATIENT
Start: 2022-08-26 | End: 2022-10-10

## 2022-08-26 NOTE — TELEPHONE ENCOUNTER
Provider needs to review PDMP    PDMP Monitoring:    Last PDMP Nitin Bishop as Reviewed [facility]):  Review User Review Instant Review Result   CONRADO Delong 3/28/2022  1:23 PM Reviewed PDMP [1]     Urine Drug Screenings (1 yr)     POCT Rapid Drug Screen  Collected: 12/21/2021 (Final result)              Medication Contract and Consent for Opioid Use Documents Filed     Patient Documents     Type of Document Status Date Received Received By Description    Medication Contract Signed 10/8/2018  3:27 PM Dandy Reddy

## 2022-10-10 DIAGNOSIS — F90.2 ATTENTION DEFICIT HYPERACTIVITY DISORDER (ADHD), COMBINED TYPE: ICD-10-CM

## 2022-10-10 RX ORDER — LISDEXAMFETAMINE DIMESYLATE 40 MG/1
CAPSULE ORAL
Qty: 30 CAPSULE | Refills: 0 | Status: SHIPPED | OUTPATIENT
Start: 2022-10-10 | End: 2022-11-09

## 2022-10-10 NOTE — TELEPHONE ENCOUNTER
PDMP Monitoring:    Last PDMP Iggy Berkowitz as Reviewed [facility]):  Review User Review Instant Review Result   CONRADO VILLALPANDO 8/26/2022 11:26 AM Reviewed PDMP [1]     Urine Drug Screenings (1 yr)     POCT Rapid Drug Screen  Collected: 12/21/2021 (Final result)              Medication Contract and Consent for Opioid Use Documents Filed     Patient Documents     Type of Document Status Date Received Received By Description    Medication Contract Signed 10/8/2018  3:27 PM Ron Garcia

## 2022-11-08 DIAGNOSIS — F90.2 ATTENTION DEFICIT HYPERACTIVITY DISORDER (ADHD), COMBINED TYPE: ICD-10-CM

## 2022-11-08 RX ORDER — LISDEXAMFETAMINE DIMESYLATE 40 MG/1
CAPSULE ORAL
Qty: 30 CAPSULE | Refills: 0 | Status: SHIPPED | OUTPATIENT
Start: 2022-11-08 | End: 2022-12-08

## 2022-11-08 NOTE — TELEPHONE ENCOUNTER
PDMP Monitoring:    Last PDMP Sunday Harris as Reviewed [facility]):  Review User Review Instant Review Result   CONRADO VILLALPANDO 8/26/2022 11:26 AM Reviewed PDMP [1]     Urine Drug Screenings (1 yr)     POCT Rapid Drug Screen  Collected: 12/21/2021 (Final result)              Medication Contract and Consent for Opioid Use Documents Filed     Patient Documents     Type of Document Status Date Received Received By Description    Medication Contract Signed 10/8/2018  3:27 PM Shira Hi

## 2022-12-05 DIAGNOSIS — F90.2 ATTENTION DEFICIT HYPERACTIVITY DISORDER (ADHD), COMBINED TYPE: ICD-10-CM

## 2022-12-05 RX ORDER — LISDEXAMFETAMINE DIMESYLATE 40 MG/1
CAPSULE ORAL
Qty: 30 CAPSULE | Refills: 0 | Status: SHIPPED | OUTPATIENT
Start: 2022-12-05 | End: 2023-01-04

## 2022-12-05 RX ORDER — CITALOPRAM 10 MG/1
TABLET ORAL
Qty: 30 TABLET | Refills: 5 | Status: SHIPPED | OUTPATIENT
Start: 2022-12-05

## 2022-12-05 NOTE — TELEPHONE ENCOUNTER
Provider needs to review PDMP    PDMP Monitoring:    Last PDMP Roxana Hortonheaven as Reviewed [facility]):  Review User Review Instant Review Result   CONRADO Del Real 8/26/2022 11:26 AM Reviewed PDMP [1]     Urine Drug Screenings (1 yr)     POCT Rapid Drug Screen  Collected: 12/21/2021 (Final result)              Medication Contract and Consent for Opioid Use Documents Filed     Patient Documents     Type of Document Status Date Received Received By Description    Medication Contract Signed 10/8/2018  3:27 PM Leonard Guaman

## 2023-01-05 DIAGNOSIS — F90.2 ATTENTION DEFICIT HYPERACTIVITY DISORDER (ADHD), COMBINED TYPE: ICD-10-CM

## 2023-01-09 RX ORDER — LISDEXAMFETAMINE DIMESYLATE 40 MG/1
CAPSULE ORAL
Qty: 30 CAPSULE | Refills: 0 | OUTPATIENT
Start: 2023-01-09 | End: 2023-02-08

## 2023-01-13 ENCOUNTER — OFFICE VISIT (OUTPATIENT)
Dept: PRIMARY CARE CLINIC | Age: 23
End: 2023-01-13

## 2023-01-13 VITALS
OXYGEN SATURATION: 98 % | WEIGHT: 190 LBS | TEMPERATURE: 97.6 F | BODY MASS INDEX: 23.62 KG/M2 | RESPIRATION RATE: 16 BRPM | DIASTOLIC BLOOD PRESSURE: 78 MMHG | HEART RATE: 76 BPM | SYSTOLIC BLOOD PRESSURE: 122 MMHG | HEIGHT: 75 IN

## 2023-01-13 DIAGNOSIS — Z79.899 MEDICATION MANAGEMENT: Primary | ICD-10-CM

## 2023-01-13 DIAGNOSIS — F90.2 ATTENTION DEFICIT HYPERACTIVITY DISORDER (ADHD), COMBINED TYPE: ICD-10-CM

## 2023-01-13 LAB
ALCOHOL URINE: NORMAL
AMPHETAMINE SCREEN, URINE: POSITIVE
BARBITURATE SCREEN, URINE: NORMAL
BENZODIAZEPINE SCREEN, URINE: NORMAL
BUPRENORPHINE URINE: NORMAL
COCAINE METABOLITE SCREEN URINE: NORMAL
FENTANYL SCREEN, URINE: NORMAL
GABAPENTIN SCREEN, URINE: NORMAL
MDMA URINE: NORMAL
METHADONE SCREEN, URINE: NORMAL
METHAMPHETAMINE, URINE: NORMAL
OPIATE SCREEN URINE: NORMAL
OXYCODONE SCREEN URINE: NORMAL
PHENCYCLIDINE SCREEN URINE: NORMAL
PROPOXYPHENE SCREEN, URINE: NORMAL
SYNTHETIC CANNABINOIDS(K2) SCREEN, URINE: NORMAL
THC SCREEN, URINE: NORMAL
TRAMADOL SCREEN URINE: NORMAL
TRICYCLIC ANTIDEPRESSANTS, UR: NORMAL

## 2023-01-13 RX ORDER — LISDEXAMFETAMINE DIMESYLATE 40 MG/1
CAPSULE ORAL
Qty: 30 CAPSULE | Refills: 0 | Status: SHIPPED | OUTPATIENT
Start: 2023-01-13 | End: 2023-02-13

## 2023-01-13 RX ORDER — SUCRALFATE 1 G/1
1 TABLET ORAL 4 TIMES DAILY
Qty: 120 TABLET | Refills: 3 | Status: SHIPPED | OUTPATIENT
Start: 2023-01-13

## 2023-01-13 ASSESSMENT — PATIENT HEALTH QUESTIONNAIRE - PHQ9
1. LITTLE INTEREST OR PLEASURE IN DOING THINGS: 0
SUM OF ALL RESPONSES TO PHQ QUESTIONS 1-9: 0
SUM OF ALL RESPONSES TO PHQ QUESTIONS 1-9: 0
SUM OF ALL RESPONSES TO PHQ9 QUESTIONS 1 & 2: 0
2. FEELING DOWN, DEPRESSED OR HOPELESS: 0
SUM OF ALL RESPONSES TO PHQ QUESTIONS 1-9: 0
SUM OF ALL RESPONSES TO PHQ QUESTIONS 1-9: 0

## 2023-01-13 ASSESSMENT — ENCOUNTER SYMPTOMS
ABDOMINAL PAIN: 1
CHEST TIGHTNESS: 0
BLOOD IN STOOL: 0
SHORTNESS OF BREATH: 0
WHEEZING: 0

## 2023-01-13 NOTE — PROGRESS NOTES
Allison Price (:  2000) is a 25 y.o. male,Established patient, here for evaluation of the following chief complaint(s):  Follow-up (6 mth follow up on ADHD meds and refill)         ASSESSMENT/PLAN:  1. Medication management  -     POCT Rapid Drug Screen  2. Attention deficit hyperactivity disorder (ADHD), combined type  -     Lisdexamfetamine Dimesylate (VYVANSE) 40 MG CAPS; TAKE ONE CAPSULE BY MOUTH EVERY MORNING, Disp-30 capsule, R-0Normal    Vyvanse refilled at this visit and CSA signed.  reviewed. Urine drug screen completed. Discussed with patient that he may need to more closely modulate his diet. Patient advised that he may need to avoid heavy fatty foods. We will also do trial with Carafate at this time. Patient advised to take this when he is particularly excited his stomach in these episodes. Return in about 6 months (around 2023). Subjective   SUBJECTIVE/OBJECTIVE:  Allison Price is a 25 y.o. male who presents for controlled substance refill. Patient has been on Vyvanse for several years and notes that it continues to work well for him without any issues. 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 Patient's weight has been stable and he says he is sleeping well. Patient maintains good appetite. Of note patient notes that he occasionally feels sick at his stomach particularly ever since he had his gallbladder taken out. Patient was wondering if there may be any sort of medicine may be able to help with this. Patient says that this is typically very intermittent and does tend to depend upon what he eats. Review of Systems   Constitutional:  Negative for activity change and fever. HENT:  Negative for congestion. Eyes:  Negative for visual disturbance. Respiratory:  Negative for chest tightness, shortness of breath and wheezing. Cardiovascular:  Negative for chest pain.    Gastrointestinal:  Positive for abdominal pain. Negative for blood in stool. Genitourinary:  Negative for difficulty urinating and urgency. Neurological:  Negative for weakness and headaches. Psychiatric/Behavioral:  Negative for confusion. Objective   Physical Exam  Vitals reviewed. Constitutional:       General: He is not in acute distress. Appearance: Normal appearance. He is not ill-appearing. HENT:      Head: Normocephalic and atraumatic. Cardiovascular:      Rate and Rhythm: Normal rate and regular rhythm. Pulses: Normal pulses. Heart sounds: Normal heart sounds. No murmur heard. Pulmonary:      Effort: Pulmonary effort is normal. No respiratory distress. Breath sounds: Normal breath sounds. No wheezing or rhonchi. Chest:      Chest wall: No tenderness. Abdominal:      General: Abdomen is flat. Bowel sounds are normal. There is no distension. Palpations: Abdomen is soft. Tenderness: There is no abdominal tenderness. Musculoskeletal:         General: No swelling. Skin:     General: Skin is warm and dry. Neurological:      General: No focal deficit present. Mental Status: He is alert. Vitals:    01/13/23 0944   BP: 122/78   Pulse: 76   Resp: 16   Temp: 97.6 °F (36.4 °C)   SpO2: 98%        Current Outpatient Medications   Medication Sig Dispense Refill    sucralfate (CARAFATE) 1 GM tablet Take 1 tablet by mouth 4 times daily 120 tablet 3    Lisdexamfetamine Dimesylate (VYVANSE) 40 MG CAPS TAKE ONE CAPSULE BY MOUTH EVERY MORNING 30 capsule 0    citalopram (CELEXA) 10 MG tablet TAKE ONE (1) TABLET BY MOUTH DAILY (Patient not taking: Reported on 1/13/2023) 30 tablet 5     No current facility-administered medications for this visit.       Family History   Problem Relation Age of Onset    Other Mother         MS    Hypertension Father     Cancer Paternal Grandmother         Colon       Past Medical History:   Diagnosis Date    ADHD (attention deficit hyperactivity disorder) Past Surgical History:   Procedure Laterality Date    CHOLECYSTECTOMY  March 2014    MOUTH SURGERY      Broke jaw and had to have surrgery       Allergies   Allergen Reactions    Hydromorphone Hcl         Lab Results   Component Value Date     08/27/2014    K 3.7 08/27/2014     08/27/2014    CO2 28 08/27/2014    BUN 7 08/27/2014    CREATININE 0.6 08/27/2014    GLUCOSE 97 08/27/2014    CALCIUM 9.3 08/27/2014    PROT 7.1 05/01/2014    LABALBU 4.3 05/01/2014    ALKPHOS 177 05/01/2014    AST 13 05/01/2014    ALT 7 05/01/2014    LABGLOM ND 08/27/2014        Lab Results   Component Value Date    WBC 5.74 05/01/2014    HGB 14.0 05/01/2014    HCT 43.0 05/01/2014    MCV 86.5 05/01/2014     05/01/2014                EMR Dragon/transcription disclaimer:  Much of this encounter note is electronic transcription/translation of spoken language toprinted texts. The electronic translation of spoken language may be erroneous, or at times, nonsensical words or phrases may be inadvertently transcribed. Although I have reviewed the note for such errors, some may stillexist.      An electronic signature was used to authenticate this note.     --Cristina Manley MD

## 2023-02-06 DIAGNOSIS — F90.2 ATTENTION DEFICIT HYPERACTIVITY DISORDER (ADHD), COMBINED TYPE: ICD-10-CM

## 2023-02-06 RX ORDER — LISDEXAMFETAMINE DIMESYLATE 40 MG/1
CAPSULE ORAL
Qty: 30 CAPSULE | Refills: 0 | OUTPATIENT
Start: 2023-02-06

## 2023-02-14 DIAGNOSIS — F90.2 ATTENTION DEFICIT HYPERACTIVITY DISORDER (ADHD), COMBINED TYPE: ICD-10-CM

## 2023-02-14 RX ORDER — LISDEXAMFETAMINE DIMESYLATE 40 MG/1
CAPSULE ORAL
Qty: 30 CAPSULE | Refills: 0 | Status: SHIPPED | OUTPATIENT
Start: 2023-02-14 | End: 2023-03-14

## 2023-02-14 NOTE — TELEPHONE ENCOUNTER
PDMP Monitoring:    Last PDMP Lara Webb as Reviewed [facility]):  Review User Review Instant Review Result   CONRADO Collins 12/5/2022  2:34 PM Reviewed PDMP [1]     Urine Drug Screenings (1 yr)     POCT Rapid Drug Screen  Collected: 1/13/2023  9:55 AM (Final result)          POCT Rapid Drug Screen  Collected: 12/21/2021 (Final result)              Medication Contract and Consent for Opioid Use Documents Filed     Patient Documents     Type of Document Status Date Received Received By Description    Medication Contract Signed 10/8/2018  3:27 PM Ysabel Melvin

## 2023-03-15 DIAGNOSIS — F90.2 ATTENTION DEFICIT HYPERACTIVITY DISORDER (ADHD), COMBINED TYPE: ICD-10-CM

## 2023-03-15 RX ORDER — LISDEXAMFETAMINE DIMESYLATE 40 MG/1
CAPSULE ORAL
Qty: 30 CAPSULE | Refills: 0 | Status: SHIPPED | OUTPATIENT
Start: 2023-03-15 | End: 2023-04-15

## 2023-03-15 NOTE — TELEPHONE ENCOUNTER
Provider needs to review PDMP    PDMP Monitoring:    Last PDMP Vivek Parkinsono as Reviewed [facility]):  Review User Review Instant Review Result   Carl Vu 12/5/2022  2:34 PM Reviewed PDMP [1]     Urine Drug Screenings (1 yr)     POCT Rapid Drug Screen  Collected: 1/13/2023  9:55 AM (Final result)          POCT Rapid Drug Screen  Collected: 12/21/2021 (Final result)              Medication Contract and Consent for Opioid Use Documents Filed     Patient Documents     Type of Document Status Date Received Received By Description    Medication Contract Signed 10/8/2018  3:27 PM Ella Bryan

## 2023-05-10 DIAGNOSIS — F90.2 ATTENTION DEFICIT HYPERACTIVITY DISORDER (ADHD), COMBINED TYPE: ICD-10-CM

## 2023-05-10 NOTE — TELEPHONE ENCOUNTER
Provider needs to review PDMP    PDMP Monitoring:    Last PDMP Henrry Smith as Reviewed [facility]):  Review User Review Instant Review Result   Isidro Guzman 3/15/2023  1:23 PM Reviewed PDMP [1]     Urine Drug Screenings (1 yr)       POCT Rapid Drug Screen  Collected: 1/13/2023  9:55 AM (Final result)              POCT Rapid Drug Screen  Collected: 12/21/2021 (Final result)                  Medication Contract and Consent for Opioid Use Documents Filed       Patient Documents       Type of Document Status Date Received Received By Description    Medication Contract Signed 10/8/2018  3:27 PM Tomer Coe

## 2023-05-11 RX ORDER — LISDEXAMFETAMINE DIMESYLATE 40 MG/1
40 CAPSULE ORAL DAILY
Qty: 30 CAPSULE | Refills: 0 | Status: SHIPPED | OUTPATIENT
Start: 2023-05-11 | End: 2023-06-10

## 2023-06-07 DIAGNOSIS — F90.2 ATTENTION DEFICIT HYPERACTIVITY DISORDER (ADHD), COMBINED TYPE: ICD-10-CM

## 2023-06-07 RX ORDER — LISDEXAMFETAMINE DIMESYLATE 40 MG/1
CAPSULE ORAL
Qty: 30 CAPSULE | Refills: 0 | Status: SHIPPED | OUTPATIENT
Start: 2023-06-07 | End: 2023-07-07

## 2023-06-07 NOTE — TELEPHONE ENCOUNTER
PDMP Monitoring:    Last PDMP Alivia Earing as Reviewed [facility]):  Review User Review Instant Review Result   Maksim Pena 5/11/2023  7:27 AM Reviewed PDMP [1]     Urine Drug Screenings (1 yr)     POCT Rapid Drug Screen  Collected: 1/13/2023  9:55 AM (Final result)          POCT Rapid Drug Screen  Collected: 12/21/2021 (Final result)              Medication Contract and Consent for Opioid Use Documents Filed     Patient Documents     Type of Document Status Date Received Received By Description    Medication Contract Signed 10/8/2018  3:27 PM Yandy High

## 2023-07-07 DIAGNOSIS — F90.2 ATTENTION DEFICIT HYPERACTIVITY DISORDER (ADHD), COMBINED TYPE: ICD-10-CM

## 2023-07-07 RX ORDER — LISDEXAMFETAMINE DIMESYLATE 40 MG/1
40 CAPSULE ORAL DAILY
Qty: 30 CAPSULE | Refills: 0 | Status: SHIPPED | OUTPATIENT
Start: 2023-07-07 | End: 2023-08-08

## 2023-07-17 ENCOUNTER — OFFICE VISIT (OUTPATIENT)
Dept: PRIMARY CARE CLINIC | Age: 23
End: 2023-07-17
Payer: MEDICAID

## 2023-07-17 VITALS
RESPIRATION RATE: 16 BRPM | OXYGEN SATURATION: 97 % | HEIGHT: 76 IN | SYSTOLIC BLOOD PRESSURE: 130 MMHG | DIASTOLIC BLOOD PRESSURE: 78 MMHG | WEIGHT: 191.2 LBS | HEART RATE: 80 BPM | BODY MASS INDEX: 23.28 KG/M2

## 2023-07-17 DIAGNOSIS — F41.9 ANXIETY: Primary | ICD-10-CM

## 2023-07-17 DIAGNOSIS — F90.2 ATTENTION DEFICIT HYPERACTIVITY DISORDER (ADHD), COMBINED TYPE: ICD-10-CM

## 2023-07-17 PROCEDURE — 99212 OFFICE O/P EST SF 10 MIN: CPT | Performed by: FAMILY MEDICINE

## 2023-07-17 ASSESSMENT — ENCOUNTER SYMPTOMS
COLOR CHANGE: 0
CONSTIPATION: 0
COUGH: 0
RHINORRHEA: 0
NAUSEA: 0
VOMITING: 0
ABDOMINAL PAIN: 0
DIARRHEA: 0

## 2023-07-17 NOTE — PROGRESS NOTES
SUBJECTIVE:    Patient ID: Murvin Dancer is a 21 y.o. male. HPI:   Patient is seen today for 6-month follow-up. He is on Vyvanse for ADHD. He is tolerating this well. He denies any changes in the medication recently. He states that He is tolerating it well. He is no longer taking his citalopram and does not feel that he needs it. He feels like his anxiety is currently well controlled. He is also not taking his Carafate. Past Medical History:   Diagnosis Date    ADHD (attention deficit hyperactivity disorder)       Current Outpatient Medications on File Prior to Visit   Medication Sig Dispense Refill    Lisdexamfetamine Dimesylate (VYVANSE) 40 MG CAPS Take 40 mg by mouth daily for 30 days. Max Daily Amount: 40 mg 30 capsule 0     No current facility-administered medications on file prior to visit. Allergies   Allergen Reactions    Hydromorphone Hcl        Review of Systems   Constitutional:  Negative for activity change, appetite change and fatigue. HENT:  Negative for congestion and rhinorrhea. Eyes:  Negative for visual disturbance. Respiratory:  Negative for cough. Cardiovascular:  Negative for chest pain and palpitations. Gastrointestinal:  Negative for abdominal pain, constipation, diarrhea, nausea and vomiting. Genitourinary:  Negative for decreased urine volume and difficulty urinating. Musculoskeletal:  Negative for arthralgias. Skin:  Negative for color change and rash. Allergic/Immunologic: Negative for immunocompromised state. Neurological:  Negative for seizures and headaches. Hematological:  Does not bruise/bleed easily. Psychiatric/Behavioral:  Negative for agitation and sleep disturbance. OBJECTIVE:    Physical Exam  Constitutional:       General: He is not in acute distress. Appearance: Normal appearance. He is well-developed. He is not diaphoretic. HENT:      Head: Normocephalic and atraumatic. Neck:      Thyroid: No thyromegaly.

## 2023-08-08 DIAGNOSIS — F90.2 ATTENTION DEFICIT HYPERACTIVITY DISORDER (ADHD), COMBINED TYPE: ICD-10-CM

## 2023-08-08 RX ORDER — LISDEXAMFETAMINE DIMESYLATE 40 MG/1
CAPSULE ORAL
Qty: 30 CAPSULE | Refills: 0 | Status: SHIPPED | OUTPATIENT
Start: 2023-08-08 | End: 2023-09-08

## 2023-08-08 NOTE — TELEPHONE ENCOUNTER
PDMP Monitoring:    Last PDMP Paige Brand as Reviewed [facility]):  Review User Review Instant Review Result   CONRADO Penny 7/17/2023  4:34 PM Reviewed PDMP [1]     Urine Drug Screenings (1 yr)     POCT Rapid Drug Screen  Collected: 1/13/2023  9:55 AM (Final result)          POCT Rapid Drug Screen  Collected: 12/21/2021 (Final result)              Medication Contract and Consent for Opioid Use Documents Filed     Patient Documents     Type of Document Status Date Received Received By Description    Medication Contract Signed 10/8/2018  3:27 PM Lottie Harrington

## 2023-09-05 DIAGNOSIS — F90.2 ATTENTION DEFICIT HYPERACTIVITY DISORDER (ADHD), COMBINED TYPE: ICD-10-CM

## 2023-09-05 RX ORDER — LISDEXAMFETAMINE DIMESYLATE 40 MG/1
CAPSULE ORAL
Qty: 30 CAPSULE | Refills: 0 | Status: SHIPPED | OUTPATIENT
Start: 2023-09-05 | End: 2023-10-05

## 2023-09-05 NOTE — TELEPHONE ENCOUNTER
Provider needs to review PDMP    PDMP Monitoring:    Last PDMP Paige Brand as Reviewed [facility]):  Review User Review Instant Review Result   Mariama Camacho 7/17/2023  4:34 PM Reviewed PDMP [1]     Urine Drug Screenings (1 yr)       POCT Rapid Drug Screen  Collected: 1/13/2023  9:55 AM (Final result)              POCT Rapid Drug Screen  Collected: 12/21/2021 (Final result)                  Medication Contract and Consent for Opioid Use Documents Filed       Patient Documents       Type of Document Status Date Received Received By Description    Medication Contract Signed 10/8/2018  3:27 PM Lottie Harrington

## 2023-09-12 ENCOUNTER — ANCILLARY PROCEDURE (OUTPATIENT)
Dept: PRIMARY CARE CLINIC | Age: 23
End: 2023-09-12

## 2023-09-12 ENCOUNTER — OFFICE VISIT (OUTPATIENT)
Dept: PRIMARY CARE CLINIC | Age: 23
End: 2023-09-12
Payer: MEDICAID

## 2023-09-12 VITALS
OXYGEN SATURATION: 99 % | TEMPERATURE: 97.6 F | BODY MASS INDEX: 24.37 KG/M2 | RESPIRATION RATE: 16 BRPM | SYSTOLIC BLOOD PRESSURE: 124 MMHG | DIASTOLIC BLOOD PRESSURE: 74 MMHG | HEART RATE: 98 BPM | HEIGHT: 75 IN | WEIGHT: 196 LBS

## 2023-09-12 DIAGNOSIS — G89.29 CHRONIC MIDLINE LOW BACK PAIN WITH BILATERAL SCIATICA: Primary | ICD-10-CM

## 2023-09-12 DIAGNOSIS — M54.41 CHRONIC MIDLINE LOW BACK PAIN WITH BILATERAL SCIATICA: ICD-10-CM

## 2023-09-12 DIAGNOSIS — G89.29 CHRONIC MIDLINE LOW BACK PAIN WITH BILATERAL SCIATICA: ICD-10-CM

## 2023-09-12 DIAGNOSIS — M54.41 CHRONIC MIDLINE LOW BACK PAIN WITH BILATERAL SCIATICA: Primary | ICD-10-CM

## 2023-09-12 DIAGNOSIS — M54.42 CHRONIC MIDLINE LOW BACK PAIN WITH BILATERAL SCIATICA: Primary | ICD-10-CM

## 2023-09-12 DIAGNOSIS — M54.42 CHRONIC MIDLINE LOW BACK PAIN WITH BILATERAL SCIATICA: ICD-10-CM

## 2023-09-12 PROCEDURE — 99214 OFFICE O/P EST MOD 30 MIN: CPT | Performed by: FAMILY MEDICINE

## 2023-09-12 RX ORDER — TIZANIDINE 2 MG/1
2 TABLET ORAL EVERY 6 HOURS PRN
Qty: 30 TABLET | Refills: 0 | Status: SHIPPED | OUTPATIENT
Start: 2023-09-12

## 2023-09-12 RX ORDER — MELOXICAM 7.5 MG/1
7.5 TABLET ORAL DAILY PRN
Qty: 30 TABLET | Refills: 0 | Status: SHIPPED | OUTPATIENT
Start: 2023-09-12

## 2023-09-13 ASSESSMENT — ENCOUNTER SYMPTOMS
BACK PAIN: 1
BLOOD IN STOOL: 0
WHEEZING: 0
CHEST TIGHTNESS: 0
ABDOMINAL PAIN: 0
SHORTNESS OF BREATH: 0

## 2023-09-14 DIAGNOSIS — M54.42 CHRONIC MIDLINE LOW BACK PAIN WITH BILATERAL SCIATICA: Primary | ICD-10-CM

## 2023-09-14 DIAGNOSIS — G89.29 CHRONIC MIDLINE LOW BACK PAIN WITH BILATERAL SCIATICA: Primary | ICD-10-CM

## 2023-09-14 DIAGNOSIS — M54.41 CHRONIC MIDLINE LOW BACK PAIN WITH BILATERAL SCIATICA: Primary | ICD-10-CM

## 2023-09-21 ENCOUNTER — TELEPHONE (OUTPATIENT)
Dept: PRIMARY CARE CLINIC | Age: 23
End: 2023-09-21

## 2023-09-21 NOTE — TELEPHONE ENCOUNTER
----- Message from Devante Fu sent at 9/21/2023  3:58 PM CDT -----  Subject: Message to Provider    QUESTIONS  Information for Provider? PT wife called with concerns of the pain meds   masking the problem of what is wrong with his back during his physical   therapy. Please f/u.  ---------------------------------------------------------------------------  --------------  Francoise Hughes FNXX  [phone]; OK to leave message on voicemail  ---------------------------------------------------------------------------  --------------  SCRIPT ANSWERS  Relationship to Patient? Spouse/Partner  Representative Name? Jae Lazo  Is the representative on the Communication Release of Information (RADHA)   form in Epic?  Yes

## 2023-09-21 NOTE — TELEPHONE ENCOUNTER
Unfortunately I do not really know because they saw Dr. Abby Serrano.    Typically the recommendation is if it is not something that has had happened with an acute injury then do physical therapy is the best option for acute flareups of lower back pain before insurance will cover additional imaging studies such as an MRI, etc.

## 2023-10-03 DIAGNOSIS — F90.2 ATTENTION DEFICIT HYPERACTIVITY DISORDER (ADHD), COMBINED TYPE: ICD-10-CM

## 2023-10-03 RX ORDER — LISDEXAMFETAMINE DIMESYLATE 40 MG/1
CAPSULE ORAL
Qty: 30 CAPSULE | Refills: 0 | Status: SHIPPED | OUTPATIENT
Start: 2023-10-03 | End: 2023-11-02

## 2023-10-03 NOTE — TELEPHONE ENCOUNTER
PDMP Monitoring:    Last PDMP Milton Mishra as Reviewed [facility]):  Review User Review Instant Review Result   CONRADO VILLALPANDO 9/5/2023 11:25 AM Reviewed PDMP [1]     Urine Drug Screenings (1 yr)     POCT Rapid Drug Screen  Collected: 1/13/2023  9:55 AM (Final result)          POCT Rapid Drug Screen  Collected: 12/21/2021 (Final result)              Medication Contract and Consent for Opioid Use Documents Filed     Patient Documents     Type of Document Status Date Received Received By Description    Medication Contract Signed 10/8/2018  3:27 PM Mohan Dominic

## 2023-11-03 DIAGNOSIS — F90.2 ATTENTION DEFICIT HYPERACTIVITY DISORDER (ADHD), COMBINED TYPE: ICD-10-CM

## 2023-11-03 RX ORDER — LISDEXAMFETAMINE DIMESYLATE 40 MG/1
CAPSULE ORAL
Qty: 30 CAPSULE | Refills: 0 | Status: SHIPPED | OUTPATIENT
Start: 2023-11-03 | End: 2023-12-05

## 2023-11-03 NOTE — TELEPHONE ENCOUNTER
PDMP Monitoring:    Last PDMP Roberto Carlos as Reviewed ([place]):  Review User Review Instant Review Result   LLOYD EDWARDS 10/3/2023 12:17 PM Reviewed PDMP [1]     Urine Drug Screenings (1 yr)     POCT Rapid Drug Screen  Collected: 1/13/2023  9:55 AM (Final result)          POCT Rapid Drug Screen  Collected: 12/21/2021 (Final result)              Medication Contract and Consent for Opioid Use Documents Filed     Patient Documents     Type of Document Status Date Received Received By Description    Medication Contract Signed 10/8/2018  3:27 PM BRANDY WELSH

## 2023-12-05 DIAGNOSIS — F90.2 ATTENTION DEFICIT HYPERACTIVITY DISORDER (ADHD), COMBINED TYPE: ICD-10-CM

## 2023-12-05 RX ORDER — LISDEXAMFETAMINE DIMESYLATE 40 MG/1
CAPSULE ORAL
Qty: 30 CAPSULE | Refills: 0 | Status: SHIPPED | OUTPATIENT
Start: 2023-12-05 | End: 2024-01-04

## 2023-12-05 NOTE — TELEPHONE ENCOUNTER
PDMP Monitoring:    Last PDMP Vance Urias as Reviewed [facility]):  Review User Review Instant Review Result   Urbano Ortiz 11/3/2023  5:12 PM Reviewed PDMP [1]     Urine Drug Screenings (1 yr)       POCT Rapid Drug Screen  Collected: 1/13/2023  9:55 AM (Final result)              POCT Rapid Drug Screen  Collected: 12/21/2021 (Final result)                  Medication Contract and Consent for Opioid Use Documents Filed       Patient Documents       Type of Document Status Date Received Received By Description    Medication Contract Signed 10/8/2018  3:27 PM Po Manley

## 2024-01-05 DIAGNOSIS — F90.2 ATTENTION DEFICIT HYPERACTIVITY DISORDER (ADHD), COMBINED TYPE: ICD-10-CM

## 2024-01-05 RX ORDER — LISDEXAMFETAMINE DIMESYLATE 40 MG/1
CAPSULE ORAL
Qty: 30 CAPSULE | Refills: 0 | Status: SHIPPED | OUTPATIENT
Start: 2024-01-05 | End: 2024-02-04

## 2024-01-05 NOTE — TELEPHONE ENCOUNTER
PDMP Monitoring:    Last PDMP Roberto Carlos as Reviewed ([place]):  Review User Review Instant Review Result   LLOYD EDWARDS 11/3/2023  5:12 PM Reviewed PDMP [1]     Urine Drug Screenings (1 yr)       POCT Rapid Drug Screen  Collected: 1/13/2023  9:55 AM (Final result)              POCT Rapid Drug Screen  Collected: 12/21/2021 (Final result)                  Medication Contract and Consent for Opioid Use Documents Filed       Patient Documents       Type of Document Status Date Received Received By Description    Medication Contract Signed 10/8/2018  3:27 PM BRANDY WELSH

## 2024-01-29 ENCOUNTER — OFFICE VISIT (OUTPATIENT)
Dept: PRIMARY CARE CLINIC | Age: 24
End: 2024-01-29
Payer: MEDICAID

## 2024-01-29 VITALS
HEIGHT: 76 IN | HEART RATE: 109 BPM | OXYGEN SATURATION: 100 % | SYSTOLIC BLOOD PRESSURE: 130 MMHG | TEMPERATURE: 97.2 F | RESPIRATION RATE: 18 BRPM | DIASTOLIC BLOOD PRESSURE: 80 MMHG | WEIGHT: 197.2 LBS | BODY MASS INDEX: 24.01 KG/M2

## 2024-01-29 DIAGNOSIS — F90.2 ATTENTION DEFICIT HYPERACTIVITY DISORDER (ADHD), COMBINED TYPE: Primary | ICD-10-CM

## 2024-01-29 DIAGNOSIS — Z79.899 MEDICATION MANAGEMENT: ICD-10-CM

## 2024-01-29 PROCEDURE — 99213 OFFICE O/P EST LOW 20 MIN: CPT | Performed by: FAMILY MEDICINE

## 2024-01-29 RX ORDER — LISDEXAMFETAMINE DIMESYLATE CAPSULES 40 MG/1
1 CAPSULE ORAL DAILY
Qty: 30 CAPSULE | Refills: 0 | Status: CANCELLED | OUTPATIENT
Start: 2024-01-29 | End: 2024-02-28

## 2024-01-29 RX ORDER — LISDEXAMFETAMINE DIMESYLATE CAPSULES 50 MG/1
50 CAPSULE ORAL EVERY MORNING
Qty: 30 CAPSULE | Refills: 0 | Status: SHIPPED | OUTPATIENT
Start: 2024-01-29 | End: 2024-02-28

## 2024-01-29 SDOH — ECONOMIC STABILITY: FOOD INSECURITY: WITHIN THE PAST 12 MONTHS, YOU WORRIED THAT YOUR FOOD WOULD RUN OUT BEFORE YOU GOT MONEY TO BUY MORE.: NEVER TRUE

## 2024-01-29 SDOH — ECONOMIC STABILITY: FOOD INSECURITY: WITHIN THE PAST 12 MONTHS, THE FOOD YOU BOUGHT JUST DIDN'T LAST AND YOU DIDN'T HAVE MONEY TO GET MORE.: NEVER TRUE

## 2024-01-29 SDOH — ECONOMIC STABILITY: HOUSING INSECURITY
IN THE LAST 12 MONTHS, WAS THERE A TIME WHEN YOU DID NOT HAVE A STEADY PLACE TO SLEEP OR SLEPT IN A SHELTER (INCLUDING NOW)?: NO

## 2024-01-29 SDOH — ECONOMIC STABILITY: INCOME INSECURITY: HOW HARD IS IT FOR YOU TO PAY FOR THE VERY BASICS LIKE FOOD, HOUSING, MEDICAL CARE, AND HEATING?: NOT VERY HARD

## 2024-01-29 ASSESSMENT — ENCOUNTER SYMPTOMS
NAUSEA: 0
ABDOMINAL PAIN: 0
COLOR CHANGE: 0
RHINORRHEA: 0
CONSTIPATION: 0
VOMITING: 0
DIARRHEA: 0
COUGH: 0

## 2024-01-29 NOTE — PROGRESS NOTES
10/08/2018 1521          Urine Drug Screenings (1 yr)       POCT Rapid Drug Screen  Collected: 1/29/2024 (Final result)              POCT Rapid Drug Screen  Collected: 1/13/2023  9:55 AM (Final result)              POCT Rapid Drug Screen  Collected: 12/21/2021 (Final result)                  Medication Contract and Consent for Opioid Use Documents Filed       Patient Documents       Type of Document Status Date Received Received By Description    Medication Contract Signed 10/8/2018  3:27 PM BRANDY WELSH/transcription disclaimer:  Much of this encounter note is electronic transcription/translation of spoken language toprinted texts.  The electronic translation of spoken language may be erroneous, or at times, nonsensical words or phrases may be inadvertently transcribed.  Although I have reviewed the note for such errors, some may stillexist.

## 2024-02-27 DIAGNOSIS — F90.2 ATTENTION DEFICIT HYPERACTIVITY DISORDER (ADHD), COMBINED TYPE: ICD-10-CM

## 2024-02-27 RX ORDER — LISDEXAMFETAMINE DIMESYLATE 50 MG
CAPSULE ORAL
Qty: 30 CAPSULE | Refills: 0 | Status: SHIPPED | OUTPATIENT
Start: 2024-02-27 | End: 2024-03-28

## 2024-02-27 NOTE — TELEPHONE ENCOUNTER
PDMP Monitoring:    Last PDMP Roberto Carlos as Reviewed ([place]):  Review User Review Instant Review Result   SAMANCONRADO PARKS 1/29/2024 11:29 AM Reviewed PDMP [1]     Urine Drug Screenings (1 yr)       POCT Rapid Drug Screen  Collected: 1/29/2024 (Final result)              POCT Rapid Drug Screen  Collected: 1/13/2023  9:55 AM (Final result)              POCT Rapid Drug Screen  Collected: 12/21/2021 (Final result)                  Medication Contract and Consent for Opioid Use Documents Filed       Patient Documents       Type of Document Status Date Received Received By Description    Medication Contract Signed 10/8/2018  3:27 PM BRANDY WELSH

## 2024-03-28 DIAGNOSIS — F90.2 ATTENTION DEFICIT HYPERACTIVITY DISORDER (ADHD), COMBINED TYPE: ICD-10-CM

## 2024-03-28 RX ORDER — LISDEXAMFETAMINE DIMESYLATE 50 MG
CAPSULE ORAL
Qty: 30 CAPSULE | Refills: 0 | Status: SHIPPED | OUTPATIENT
Start: 2024-03-28 | End: 2024-04-27

## 2024-03-28 NOTE — TELEPHONE ENCOUNTER
Sumit Abrahamony called to request a refill on his medication.      Last office visit : 1/29/2024   Next office visit : 7/29/2024     Last UDS:   Amphetamine Screen, Urine   Date Value Ref Range Status   01/29/2024 positive  Final     Comment:     prescribed Vyvanse     Barbiturate Screen, Urine   Date Value Ref Range Status   01/29/2024 neg  Final     Benzodiazepine Screen, Urine   Date Value Ref Range Status   01/29/2024 neg  Final     Buprenorphine Urine   Date Value Ref Range Status   01/29/2024 neg  Final     Cocaine Metabolite Screen, Urine   Date Value Ref Range Status   01/29/2024 neg  Final     Gabapentin Screen, Urine   Date Value Ref Range Status   01/13/2023 n  Final     MDMA, Urine   Date Value Ref Range Status   01/29/2024 neg  Final     Methamphetamine, Urine   Date Value Ref Range Status   01/29/2024 neg  Final     Opiate Scrn, Ur   Date Value Ref Range Status   01/29/2024 neg  Final     Oxycodone Screen, Ur   Date Value Ref Range Status   01/29/2024 neg  Final     PCP Screen, Urine   Date Value Ref Range Status   01/29/2024 neg  Final     Propoxyphene Screen, Urine   Date Value Ref Range Status   01/29/2024 neg  Final     THC Screen, Urine   Date Value Ref Range Status   01/29/2024 neg  Final     Tricyclic Antidepressants, Urine   Date Value Ref Range Status   01/29/2024 neg  Final         Medication Contract: 10/8/18   Last Fill: 2/27/24    Requested Prescriptions     Pending Prescriptions Disp Refills    VYVANSE 50 MG capsule [Pharmacy Med Name: VYVANSE 50MG CAPSULE] 30 capsule 0     Sig: TAKE ONE (1) CAPSULE BY MOUTH EVERY MORNING FOR 30 DAYS. MAX DAILY AMOUNT: 50 MG         Please approve or refuse this medication.   Key Ibarra, TIAN

## 2024-05-01 DIAGNOSIS — F90.2 ATTENTION DEFICIT HYPERACTIVITY DISORDER (ADHD), COMBINED TYPE: ICD-10-CM

## 2024-05-01 RX ORDER — LISDEXAMFETAMINE DIMESYLATE 50 MG
CAPSULE ORAL
Qty: 30 CAPSULE | Refills: 0 | Status: SHIPPED | OUTPATIENT
Start: 2024-05-01 | End: 2024-05-31

## 2024-05-01 NOTE — TELEPHONE ENCOUNTER
Provider needs to review PDMP    PDMP Monitoring:    Last PDMP Roberto Carlos as Reviewed ([place]):  Review User Review Instant Review Result   CONRADO VILLALPANDO 1/29/2024 11:29 AM Reviewed PDMP [1]     Urine Drug Screenings (1 yr)       POCT Rapid Drug Screen  Collected: 1/29/2024 (Final result)              POCT Rapid Drug Screen  Collected: 1/13/2023  9:55 AM (Final result)              POCT Rapid Drug Screen  Collected: 12/21/2021 (Final result)                  Medication Contract and Consent for Opioid Use Documents Filed       Patient Documents       Type of Document Status Date Received Received By Description    Medication Contract Signed 10/8/2018  3:27 PM BRANDY WELSH

## 2024-07-15 DIAGNOSIS — F90.2 ATTENTION DEFICIT HYPERACTIVITY DISORDER (ADHD), COMBINED TYPE: ICD-10-CM

## 2024-07-15 RX ORDER — LISDEXAMFETAMINE DIMESYLATE 50 MG
CAPSULE ORAL
Qty: 30 CAPSULE | Refills: 0 | Status: SHIPPED | OUTPATIENT
Start: 2024-07-15 | End: 2024-08-14

## 2024-08-13 ENCOUNTER — OFFICE VISIT (OUTPATIENT)
Dept: PRIMARY CARE CLINIC | Age: 24
End: 2024-08-13
Payer: MEDICAID

## 2024-08-13 VITALS
SYSTOLIC BLOOD PRESSURE: 124 MMHG | HEART RATE: 90 BPM | RESPIRATION RATE: 16 BRPM | OXYGEN SATURATION: 99 % | HEIGHT: 75 IN | BODY MASS INDEX: 24.99 KG/M2 | DIASTOLIC BLOOD PRESSURE: 80 MMHG | WEIGHT: 201 LBS | TEMPERATURE: 98.8 F

## 2024-08-13 DIAGNOSIS — F90.9 ATTENTION DEFICIT HYPERACTIVITY DISORDER (ADHD), UNSPECIFIED ADHD TYPE: Primary | ICD-10-CM

## 2024-08-13 DIAGNOSIS — F90.2 ATTENTION DEFICIT HYPERACTIVITY DISORDER (ADHD), COMBINED TYPE: ICD-10-CM

## 2024-08-13 PROCEDURE — 99213 OFFICE O/P EST LOW 20 MIN: CPT | Performed by: FAMILY MEDICINE

## 2024-08-13 RX ORDER — LISDEXAMFETAMINE DIMESYLATE 50 MG/1
50 CAPSULE ORAL EVERY MORNING
Qty: 30 CAPSULE | Refills: 0 | Status: SHIPPED | OUTPATIENT
Start: 2024-08-13 | End: 2024-09-12

## 2024-08-13 NOTE — ASSESSMENT & PLAN NOTE
Well-controlled, continue current medications    Orders:    lisdexamfetamine (VYVANSE) 50 MG capsule; Take 1 capsule by mouth every morning for 30 days. Max Daily Amount: 50 mg

## 2024-08-13 NOTE — PROGRESS NOTES
Sumit Medrano (: 2000) is a 24 y.o. male is here for evaluation of the following chief complaint(s): 6 Month Follow-Up    Assessment/Plan:     Assessment & Plan  Attention deficit hyperactivity disorder (ADHD), combined type   Well-controlled, continue current medications    Orders:    lisdexamfetamine (VYVANSE) 50 MG capsule; Take 1 capsule by mouth every morning for 30 days. Max Daily Amount: 50 mg    Attention deficit hyperactivity disorder (ADHD), unspecified ADHD type            No follow-ups on file.  Subjective/Objective:   Since last visit: no change.  Medication compliance: all of the time.  Side effects from medication include: none.   has been reviewed.     Pertinent items are noted in HPI.      Exam:  General: alert, appears stated age, and cooperative  Heart exam: normal rate, regular rhythm, normal S1, S2, no murmurs, rubs, clicks or gallops  Lung exam: clear to auscultation bilaterally  Neuro: no gross deficits  Mental Status exam: normal mood, behavior, and thought processes, able to follow commands   /80   Pulse 90   Temp 98.8 °F (37.1 °C) (Temporal)   Resp 16   Ht 1.905 m (6' 3\")   Wt 91.2 kg (201 lb)   SpO2 99%   BMI 25.12 kg/m²       An electronic signature was used to authenticate this note.  -- Jyoti Hill MD

## 2024-09-16 DIAGNOSIS — F90.2 ATTENTION DEFICIT HYPERACTIVITY DISORDER (ADHD), COMBINED TYPE: ICD-10-CM

## 2024-09-16 RX ORDER — LISDEXAMFETAMINE DIMESYLATE 50 MG
CAPSULE ORAL
Qty: 30 CAPSULE | Refills: 0 | Status: SHIPPED | OUTPATIENT
Start: 2024-09-16 | End: 2024-10-16

## 2024-10-22 DIAGNOSIS — F90.2 ATTENTION DEFICIT HYPERACTIVITY DISORDER (ADHD), COMBINED TYPE: ICD-10-CM

## 2024-10-22 RX ORDER — LISDEXAMFETAMINE DIMESYLATE 50 MG
CAPSULE ORAL
Qty: 30 CAPSULE | Refills: 0 | Status: SHIPPED | OUTPATIENT
Start: 2024-10-22 | End: 2024-11-21

## 2024-10-22 NOTE — TELEPHONE ENCOUNTER
Provider needs to review PDMP    PDMP Monitoring:    Last PDMP Roberto Carlos as Reviewed ([place]):  Review User Review Instant Review Result   CONRADO VILLALPANDO 7/15/2024 12:55 PM Reviewed PDMP [1]     Urine Drug Screenings (1 yr)       POCT Rapid Drug Screen  Collected: 1/29/2024 (Final result)              POCT Rapid Drug Screen  Collected: 1/13/2023  9:55 AM (Final result)              POCT Rapid Drug Screen  Collected: 12/21/2021 (Final result)                  Medication Contract and Consent for Opioid Use Documents Filed       Patient Documents       Type of Document Status Date Received Received By Description    Medication Contract Signed 10/8/2018  3:27 PM BRANDY WELSH

## 2024-11-20 DIAGNOSIS — F90.2 ATTENTION DEFICIT HYPERACTIVITY DISORDER (ADHD), COMBINED TYPE: ICD-10-CM

## 2024-11-20 NOTE — TELEPHONE ENCOUNTER
PDMP Monitoring:    Last PDMP Roberto Carlos as Reviewed ([place]):  Review User Review Instant Review Result   SAMANCHARLYCONRADO DRUMMOND 10/22/2024  1:17 PM Reviewed PDMP [1]     Urine Drug Screenings (1 yr)       POCT Rapid Drug Screen  Collected: 1/29/2024 (Final result)              POCT Rapid Drug Screen  Collected: 1/13/2023  9:55 AM (Final result)              POCT Rapid Drug Screen  Collected: 12/21/2021 (Final result)                  Medication Contract and Consent for Opioid Use Documents Filed       Patient Documents       Type of Document Status Date Received Received By Description    Medication Contract Signed 10/8/2018  3:27 PM BRANDY WELSH

## 2024-11-21 RX ORDER — LISDEXAMFETAMINE DIMESYLATE 50 MG
CAPSULE ORAL
Qty: 30 CAPSULE | Refills: 0 | Status: SHIPPED | OUTPATIENT
Start: 2024-11-21 | End: 2024-12-21

## 2024-12-23 DIAGNOSIS — F90.2 ATTENTION DEFICIT HYPERACTIVITY DISORDER (ADHD), COMBINED TYPE: ICD-10-CM

## 2024-12-23 RX ORDER — LISDEXAMFETAMINE DIMESYLATE 50 MG
CAPSULE ORAL
Qty: 30 CAPSULE | Refills: 0 | Status: SHIPPED | OUTPATIENT
Start: 2024-12-23 | End: 2025-01-22

## 2024-12-23 NOTE — TELEPHONE ENCOUNTER
PDMP Monitoring:    Last PDMP Roberto Carlos as Reviewed ([place]):  Review User Review Instant Review Result   IRASEMAIGORA 10/22/2024  1:17 PM Reviewed PDMP [1]     Urine Drug Screenings (1 yr)       POCT Rapid Drug Screen  Collected: 1/29/2024 (Final result)              POCT Rapid Drug Screen  Collected: 1/13/2023  9:55 AM (Final result)              POCT Rapid Drug Screen  Collected: 12/21/2021 (Final result)                  Medication Contract and Consent for Opioid Use Documents Filed       Patient Documents       Type of Document Status Date Received Received By Description    Medication Contract Signed 10/8/2018  3:27 PM BRANDY WELSH     Medication Contract Received 11/21/2024  9:31 AM EDWARD TINAJERO Medication contract

## 2025-01-28 DIAGNOSIS — F90.2 ATTENTION DEFICIT HYPERACTIVITY DISORDER (ADHD), COMBINED TYPE: ICD-10-CM

## 2025-01-28 RX ORDER — LISDEXAMFETAMINE DIMESYLATE 50 MG
CAPSULE ORAL
Qty: 30 CAPSULE | Refills: 0 | Status: SHIPPED | OUTPATIENT
Start: 2025-01-28 | End: 2025-02-27

## 2025-01-28 NOTE — TELEPHONE ENCOUNTER
Provider needs to review PDMP    PDMP Monitoring:    Last PDMP Roberto Carlos as Reviewed ([place]):  Review User Review Instant Review Result   CONRADO VILLALPANDO 10/22/2024  1:17 PM Reviewed PDMP [1]       Last office visit for requested medication : 8/13/2024    Next office visit : 2/13/2025     Last UDS:   Benzodiazepine Screen, Urine   Date Value Ref Range Status   01/29/2024 neg  Final     Buprenorphine Urine   Date Value Ref Range Status   01/29/2024 neg  Final     Cocaine Metabolite Screen, Urine   Date Value Ref Range Status   01/29/2024 neg  Final     Gabapentin Screen, Urine   Date Value Ref Range Status   01/13/2023 n  Final     Oxycodone Screen, Ur   Date Value Ref Range Status   01/29/2024 neg  Final     Propoxyphene Screen, Urine   Date Value Ref Range Status   01/29/2024 neg  Final     THC Screen, Urine   Date Value Ref Range Status   01/29/2024 neg  Final     Tricyclic Antidepressants, Urine   Date Value Ref Range Status   01/29/2024 neg  Final       Medication Contract and Consent for Opioid Use Documents Filed       Patient Documents       Type of Document Status Date Received Received By Description    Medication Contract Signed 10/8/2018  3:27 PM BRANDY WELSH     Medication Contract Received 11/21/2024  9:31 AM EDWARD TINAJERO Medication contract                    Requested Prescriptions     Pending Prescriptions Disp Refills    VYVANSE 50 MG capsule [Pharmacy Med Name: VYVANSE 50MG CAPSULE] 30 capsule 0     Sig: TAKE ONE (1) CAPSULE BY MOUTH EVERY MORNING FOR 30 DAYS. MAX DAILY AMOUNT: 50 MG         Please approve or refuse this medication.   Lacey Miller MA

## 2025-02-13 ENCOUNTER — OFFICE VISIT (OUTPATIENT)
Dept: PRIMARY CARE CLINIC | Age: 25
End: 2025-02-13

## 2025-02-13 VITALS
BODY MASS INDEX: 25.33 KG/M2 | OXYGEN SATURATION: 98 % | HEART RATE: 89 BPM | TEMPERATURE: 97.4 F | WEIGHT: 208 LBS | DIASTOLIC BLOOD PRESSURE: 80 MMHG | RESPIRATION RATE: 16 BRPM | HEIGHT: 76 IN | SYSTOLIC BLOOD PRESSURE: 130 MMHG

## 2025-02-13 DIAGNOSIS — Z79.899 MEDICATION MANAGEMENT: Primary | ICD-10-CM

## 2025-02-13 DIAGNOSIS — F90.2 ATTENTION DEFICIT HYPERACTIVITY DISORDER (ADHD), COMBINED TYPE: ICD-10-CM

## 2025-02-13 SDOH — ECONOMIC STABILITY: FOOD INSECURITY: WITHIN THE PAST 12 MONTHS, YOU WORRIED THAT YOUR FOOD WOULD RUN OUT BEFORE YOU GOT MONEY TO BUY MORE.: NEVER TRUE

## 2025-02-13 SDOH — ECONOMIC STABILITY: FOOD INSECURITY: WITHIN THE PAST 12 MONTHS, THE FOOD YOU BOUGHT JUST DIDN'T LAST AND YOU DIDN'T HAVE MONEY TO GET MORE.: NEVER TRUE

## 2025-02-13 ASSESSMENT — PATIENT HEALTH QUESTIONNAIRE - PHQ9
2. FEELING DOWN, DEPRESSED OR HOPELESS: NOT AT ALL
SUM OF ALL RESPONSES TO PHQ QUESTIONS 1-9: 0
1. LITTLE INTEREST OR PLEASURE IN DOING THINGS: NOT AT ALL
SUM OF ALL RESPONSES TO PHQ QUESTIONS 1-9: 0
SUM OF ALL RESPONSES TO PHQ9 QUESTIONS 1 & 2: 0

## 2025-02-13 ASSESSMENT — ENCOUNTER SYMPTOMS
ABDOMINAL PAIN: 0
COLOR CHANGE: 0
NAUSEA: 0
COUGH: 0
DIARRHEA: 0
VOMITING: 0
CONSTIPATION: 0
RHINORRHEA: 0

## 2025-02-13 NOTE — PROGRESS NOTES
SUBJECTIVE:    Patient ID: Sumit Medrano is a 24 y.o. male.    HPI:   Patient is seen today for 6-month ADHD follow-up.  He is currently on Vyvanse 50 mg.  He is tolerating this well.  Of note, his wife states that his nosebleeds have essentially stopped.  His blood pressure is well-controlled in office today.  He states that he is tolerating medication well denies any side effects    Past Medical History:   Diagnosis Date    ADHD (attention deficit hyperactivity disorder)       Current Outpatient Medications on File Prior to Visit   Medication Sig Dispense Refill    VYVANSE 50 MG capsule TAKE ONE (1) CAPSULE BY MOUTH EVERY MORNING FOR 30 DAYS. MAX DAILY AMOUNT: 50 MG 30 capsule 0     No current facility-administered medications on file prior to visit.     Allergies   Allergen Reactions    Hydromorphone Hcl        Review of Systems   Constitutional:  Negative for activity change, appetite change and fatigue.   HENT:  Negative for congestion and rhinorrhea.    Eyes:  Negative for visual disturbance.   Respiratory:  Negative for cough.    Cardiovascular:  Negative for chest pain and palpitations.   Gastrointestinal:  Negative for abdominal pain, constipation, diarrhea, nausea and vomiting.   Genitourinary:  Negative for decreased urine volume and difficulty urinating.   Musculoskeletal:  Negative for arthralgias.   Skin:  Negative for color change and rash.   Allergic/Immunologic: Negative for immunocompromised state.   Neurological:  Negative for seizures and headaches.   Hematological:  Does not bruise/bleed easily.   Psychiatric/Behavioral:  Negative for agitation and sleep disturbance.        OBJECTIVE:    Physical Exam  Constitutional:       General: He is not in acute distress.     Appearance: Normal appearance. He is well-developed. He is not diaphoretic.   HENT:      Head: Normocephalic and atraumatic.   Neck:      Thyroid: No thyromegaly.   Cardiovascular:      Rate and Rhythm: Normal rate and regular

## 2025-02-27 DIAGNOSIS — F90.2 ATTENTION DEFICIT HYPERACTIVITY DISORDER (ADHD), COMBINED TYPE: ICD-10-CM

## 2025-02-27 RX ORDER — LISDEXAMFETAMINE DIMESYLATE 50 MG
CAPSULE ORAL
Qty: 30 CAPSULE | Refills: 0 | Status: SHIPPED | OUTPATIENT
Start: 2025-02-27 | End: 2025-03-29

## 2025-02-27 NOTE — TELEPHONE ENCOUNTER
PDMP Monitoring:    Last PDMP Roberto Carlos as Reviewed ([place]):  Review User Review Instant Review Result   CONRADO VILLALPANDO 1/28/2025  2:16 PM Reviewed PDMP [1]       Last office visit for requested medication : 2/13/2025    Next office visit : 8/14/2025     Last UDS:   Benzodiazepine Screen, Urine   Date Value Ref Range Status   01/29/2024 neg  Final     Buprenorphine Urine   Date Value Ref Range Status   01/29/2024 neg  Final     Cocaine Metabolite Screen, Urine   Date Value Ref Range Status   01/29/2024 neg  Final     Gabapentin Screen, Urine   Date Value Ref Range Status   01/13/2023 n  Final     Oxycodone Screen, Ur   Date Value Ref Range Status   01/29/2024 neg  Final     Propoxyphene Screen, Urine   Date Value Ref Range Status   01/29/2024 neg  Final     THC Screen, Urine   Date Value Ref Range Status   01/29/2024 neg  Final     Tricyclic Antidepressants, Urine   Date Value Ref Range Status   01/29/2024 neg  Final       Medication Contract and Consent for Opioid Use Documents Filed       Patient Documents       Type of Document Status Date Received Received By Description    Medication Contract Signed 10/8/2018  3:27 PM BRANDY WELSH     Medication Contract Received 11/21/2024  9:31 AM EDWARD TINAJERO Medication contract    Medication Contract Received 2/13/2025  3:58 PM LACEY MILLER Medication Contract                    Requested Prescriptions     Pending Prescriptions Disp Refills    VYVANSE 50 MG capsule [Pharmacy Med Name: VYVANSE 50MG CAPSULE] 30 capsule 0     Sig: TAKE ONE (1) CAPSULE BY MOUTH EVERY MORNING FOR 30 DAYS. MAX DAILY AMOUNT: 50 MG         Please approve or refuse this medication.   Lacey Miller MA

## 2025-04-08 DIAGNOSIS — F90.2 ATTENTION DEFICIT HYPERACTIVITY DISORDER (ADHD), COMBINED TYPE: ICD-10-CM

## 2025-04-08 RX ORDER — LISDEXAMFETAMINE DIMESYLATE 50 MG
CAPSULE ORAL
Qty: 30 CAPSULE | Refills: 0 | Status: SHIPPED | OUTPATIENT
Start: 2025-04-08 | End: 2025-05-08

## 2025-04-08 NOTE — TELEPHONE ENCOUNTER
Provider needs to review PDMP    PDMP Monitoring:    Last PDMP [place] Carlos as Reviewed ([place]):  Review User Review Instant Review Result   CONRADO VILLALPANDO 1/28/2025  2:16 PM Reviewed PDMP [1]       Last office visit for requested medication : 2-13-25   Next office visit : 8/14/2025     Last UDS:   Benzodiazepine Screen, Urine   Date Value Ref Range Status   01/29/2024 neg  Final     Buprenorphine Urine   Date Value Ref Range Status   01/29/2024 neg  Final     Cocaine Metabolite Screen, Urine   Date Value Ref Range Status   01/29/2024 neg  Final     Gabapentin Screen, Urine   Date Value Ref Range Status   01/13/2023 n  Final     Oxycodone Screen, Ur   Date Value Ref Range Status   01/29/2024 neg  Final     Propoxyphene Screen, Urine   Date Value Ref Range Status   01/29/2024 neg  Final     THC Screen, Urine   Date Value Ref Range Status   01/29/2024 neg  Final     Tricyclic Antidepressants, Urine   Date Value Ref Range Status   01/29/2024 neg  Final       Medication Contract and Consent for Opioid Use Documents Filed       Patient Documents       Type of Document Status Date Received Received By Description    Medication Contract Signed 10/8/2018  3:27 PM BRANDY WELSH     Medication Contract Received 11/21/2024  9:31 AM EDWARD TINAJERO Medication contract    Medication Contract Received 2/13/2025  3:58 PM ELIAS LOJA Medication Contract                    Requested Prescriptions     Pending Prescriptions Disp Refills    VYVANSE 50 MG capsule [Pharmacy Med Name: VYVANSE 50MG CAPSULE] 30 capsule 0     Sig: TAKE ONE (1) CAPSULE BY MOUTH EVERY MORNING FOR 30 DAYS. MAX DAILY AMOUNT: 50 MG         Please approve or refuse this medication.   Shea Maldonado MA

## 2025-05-06 DIAGNOSIS — F90.2 ATTENTION DEFICIT HYPERACTIVITY DISORDER (ADHD), COMBINED TYPE: ICD-10-CM

## 2025-05-06 RX ORDER — LISDEXAMFETAMINE DIMESYLATE 50 MG
CAPSULE ORAL
Qty: 30 CAPSULE | Refills: 0 | Status: SHIPPED | OUTPATIENT
Start: 2025-05-06 | End: 2025-06-05

## 2025-05-06 NOTE — TELEPHONE ENCOUNTER
PDMP Monitoring:    Last PDMP [place] Carlos as Reviewed ([place]):  Review User Review Instant Review Result   PHILLY BAÑUELOS 4/8/2025 12:23 PM Reviewed PDMP [1]     Urine Drug Screenings (1 yr)       POCT Rapid Drug Screen  Collected: 1/29/2024 (Final result)              POCT Rapid Drug Screen  Collected: 1/13/2023  9:55 AM (Final result)              POCT Rapid Drug Screen  Collected: 12/21/2021 (Final result)                  Medication Contract and Consent for Opioid Use Documents Filed       Patient Documents       Type of Document Status Date Received Received By Description    Medication Contract Signed 10/8/2018  3:27 PM BRANDY WELSH     Medication Contract Received 11/21/2024  9:31 AM EDWARD TINAJERO Medication contract    Medication Contract Received 2/13/2025  3:58 PM ELIAS LOJA Medication Contract

## 2025-06-13 DIAGNOSIS — F90.2 ATTENTION DEFICIT HYPERACTIVITY DISORDER (ADHD), COMBINED TYPE: ICD-10-CM

## 2025-06-16 RX ORDER — LISDEXAMFETAMINE DIMESYLATE 50 MG
CAPSULE ORAL
Qty: 30 CAPSULE | Refills: 0 | Status: SHIPPED | OUTPATIENT
Start: 2025-06-16 | End: 2025-07-16

## 2025-07-14 DIAGNOSIS — F90.2 ATTENTION DEFICIT HYPERACTIVITY DISORDER (ADHD), COMBINED TYPE: ICD-10-CM

## 2025-07-14 RX ORDER — LISDEXAMFETAMINE DIMESYLATE 50 MG
CAPSULE ORAL
Qty: 30 CAPSULE | Refills: 0 | Status: SHIPPED | OUTPATIENT
Start: 2025-07-14 | End: 2025-08-13

## 2025-07-14 NOTE — TELEPHONE ENCOUNTER
Provider needs to review PDMP    PDMP Monitoring:    Last PDMP [place] Carlos as Reviewed ([place]):  Review User Review Instant Review Result   PHILLY BAÑUELOS 4/8/2025 12:23 PM Reviewed PDMP [1]       Last office visit for requested medication : 2/13/2025    Next office visit : 8/14/2025     Last UDS:   Benzodiazepine Screen, Urine   Date Value Ref Range Status   01/29/2024 neg  Final     Buprenorphine Urine   Date Value Ref Range Status   01/29/2024 neg  Final     Cocaine Metabolite Screen, Urine   Date Value Ref Range Status   01/29/2024 neg  Final     Gabapentin Screen, Urine   Date Value Ref Range Status   01/13/2023 n  Final     Oxycodone Screen, Ur   Date Value Ref Range Status   01/29/2024 neg  Final     Propoxyphene Screen, Urine   Date Value Ref Range Status   01/29/2024 neg  Final     THC Screen, Urine   Date Value Ref Range Status   01/29/2024 neg  Final     Tricyclic Antidepressants, Urine   Date Value Ref Range Status   01/29/2024 neg  Final       Medication Contract and Consent for Opioid Use Documents Filed       Patient Documents       Type of Document Status Date Received Received By Description    Medication Contract Signed 10/8/2018  3:27 PM BRANDY WELSH     Medication Contract Received 11/21/2024  9:31 AM EDWARD TINAJERO Medication contract    Medication Contract Received 2/13/2025  3:58 PM LACEY MILLER Medication Contract                    Requested Prescriptions     Pending Prescriptions Disp Refills    VYVANSE 50 MG capsule [Pharmacy Med Name: VYVANSE 50MG CAPSULE] 30 capsule 0     Sig: TAKE ONE (1) CAPSULE BY MOUTH EVERY MORNING FOR 30 DAYS. MAX DAILY AMOUNT: 50 MG         Please approve or refuse this medication.   Lacey Miller MA

## 2025-09-02 ENCOUNTER — OFFICE VISIT (OUTPATIENT)
Dept: PRIMARY CARE CLINIC | Age: 25
End: 2025-09-02
Payer: MEDICAID

## 2025-09-02 VITALS
HEIGHT: 75 IN | TEMPERATURE: 97.7 F | RESPIRATION RATE: 16 BRPM | DIASTOLIC BLOOD PRESSURE: 82 MMHG | BODY MASS INDEX: 25.61 KG/M2 | WEIGHT: 206 LBS | HEART RATE: 89 BPM | OXYGEN SATURATION: 99 % | SYSTOLIC BLOOD PRESSURE: 124 MMHG

## 2025-09-02 DIAGNOSIS — F90.2 ATTENTION DEFICIT HYPERACTIVITY DISORDER (ADHD), COMBINED TYPE: ICD-10-CM

## 2025-09-02 PROCEDURE — 99213 OFFICE O/P EST LOW 20 MIN: CPT | Performed by: FAMILY MEDICINE

## 2025-09-02 RX ORDER — LISDEXAMFETAMINE DIMESYLATE 50 MG
50 CAPSULE ORAL EVERY MORNING
Qty: 30 CAPSULE | Refills: 0 | Status: SHIPPED | OUTPATIENT
Start: 2025-09-02 | End: 2025-10-02

## 2025-09-02 ASSESSMENT — ENCOUNTER SYMPTOMS
ABDOMINAL PAIN: 0
COLOR CHANGE: 0
RHINORRHEA: 0
DIARRHEA: 0
CONSTIPATION: 0
COUGH: 0
NAUSEA: 0
VOMITING: 0